# Patient Record
Sex: FEMALE | Race: ASIAN | NOT HISPANIC OR LATINO
[De-identification: names, ages, dates, MRNs, and addresses within clinical notes are randomized per-mention and may not be internally consistent; named-entity substitution may affect disease eponyms.]

---
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
## 2020-01-08 ENCOUNTER — PRENATAL OFFICE VISIT - HEALTHEAST (OUTPATIENT)
Dept: FAMILY MEDICINE | Facility: CLINIC | Age: 20
End: 2020-01-08

## 2020-01-08 DIAGNOSIS — Z34.93 ENCOUNTER FOR SUPERVISION OF NORMAL PREGNANCY IN THIRD TRIMESTER: ICD-10-CM

## 2020-01-08 DIAGNOSIS — N92.6 ABNORMAL MENSES: ICD-10-CM

## 2020-01-08 LAB
ALBUMIN UR-MCNC: NEGATIVE MG/DL
AMPHETAMINES UR QL SCN: NORMAL
BARBITURATES UR QL: NORMAL
BASOPHILS # BLD AUTO: 0 THOU/UL (ref 0–0.2)
BASOPHILS NFR BLD AUTO: 0 % (ref 0–2)
BENZODIAZ UR QL: NORMAL
CANNABINOIDS UR QL SCN: NORMAL
COCAINE UR QL: NORMAL
CREAT UR-MCNC: 48.4 MG/DL
EOSINOPHIL # BLD AUTO: 0.1 THOU/UL (ref 0–0.4)
EOSINOPHIL NFR BLD AUTO: 1 % (ref 0–6)
ERYTHROCYTE [DISTWIDTH] IN BLOOD BY AUTOMATED COUNT: 15.3 % (ref 11–14.5)
GLUCOSE UR STRIP-MCNC: NEGATIVE MG/DL
HCG UR QL: POSITIVE
HCT VFR BLD AUTO: 36.6 % (ref 35–47)
HGB BLD-MCNC: 11.5 G/DL (ref 12–16)
HIV 1+2 AB+HIV1 P24 AG SERPL QL IA: NEGATIVE
KETONES UR STRIP-MCNC: NEGATIVE MG/DL
LYMPHOCYTES # BLD AUTO: 1.2 THOU/UL (ref 0.8–4.4)
LYMPHOCYTES NFR BLD AUTO: 12 % (ref 20–40)
MCH RBC QN AUTO: 26.6 PG (ref 27–34)
MCHC RBC AUTO-ENTMCNC: 31.4 G/DL (ref 32–36)
MCV RBC AUTO: 85 FL (ref 80–100)
MONOCYTES # BLD AUTO: 0.6 THOU/UL (ref 0–0.9)
MONOCYTES NFR BLD AUTO: 6 % (ref 2–10)
NEUTROPHILS # BLD AUTO: 8 THOU/UL (ref 2–7.7)
NEUTROPHILS NFR BLD AUTO: 80 % (ref 50–70)
OPIATES UR QL SCN: NORMAL
OXYCODONE UR QL: NORMAL
PCP UR QL SCN: NORMAL
PLATELET # BLD AUTO: 259 THOU/UL (ref 140–440)
PMV BLD AUTO: 10.6 FL (ref 8.5–12.5)
RBC # BLD AUTO: 4.32 MILL/UL (ref 3.8–5.4)
WBC: 10 THOU/UL (ref 4–11)

## 2020-01-09 ENCOUNTER — HOSPITAL ENCOUNTER (OUTPATIENT)
Dept: ULTRASOUND IMAGING | Facility: HOSPITAL | Age: 20
Discharge: HOME | End: 2020-01-09
Attending: PHYSICIAN ASSISTANT

## 2020-01-09 LAB
ABO/RH(D): NORMAL
ABORH REPEAT: NORMAL
ANTIBODY SCREEN: NEGATIVE
BACTERIA SPEC CULT: NO GROWTH
HBV SURFACE AG SERPL QL IA: NEGATIVE
RUBV IGG SERPL QL IA: POSITIVE
T PALLIDUM AB SER QL: NEGATIVE

## 2020-01-11 LAB — LEAD BLDV-MCNC: 3.3 UG/DL (ref 0–4.9)

## 2020-01-12 LAB
COLLECTION METHOD: NORMAL
LEAD BLD-MCNC: NORMAL UG/DL

## 2020-01-19 ENCOUNTER — COMMUNICATION - HEALTHEAST (OUTPATIENT)
Dept: FAMILY MEDICINE | Facility: CLINIC | Age: 20
End: 2020-01-19

## 2020-01-29 ENCOUNTER — COMMUNICATION - HEALTHEAST (OUTPATIENT)
Dept: TELEHEALTH | Facility: CLINIC | Age: 20
End: 2020-01-29

## 2020-01-29 ENCOUNTER — PRENATAL OFFICE VISIT - HEALTHEAST (OUTPATIENT)
Dept: FAMILY MEDICINE | Facility: CLINIC | Age: 20
End: 2020-01-29

## 2020-01-29 DIAGNOSIS — Z34.82 ENCOUNTER FOR SUPERVISION OF OTHER NORMAL PREGNANCY IN SECOND TRIMESTER: ICD-10-CM

## 2020-01-29 ASSESSMENT — MIFFLIN-ST. JEOR: SCORE: 1373.03

## 2020-01-30 LAB
C TRACH DNA SPEC QL PROBE+SIG AMP: NEGATIVE
N GONORRHOEA DNA SPEC QL NAA+PROBE: NEGATIVE

## 2020-01-31 ENCOUNTER — AMBULATORY - HEALTHEAST (OUTPATIENT)
Dept: MATERNAL FETAL MEDICINE | Facility: HOSPITAL | Age: 20
End: 2020-01-31

## 2020-02-12 ENCOUNTER — COMMUNICATION - HEALTHEAST (OUTPATIENT)
Dept: FAMILY MEDICINE | Facility: CLINIC | Age: 20
End: 2020-02-12

## 2020-02-12 ENCOUNTER — PRENATAL OFFICE VISIT - HEALTHEAST (OUTPATIENT)
Dept: FAMILY MEDICINE | Facility: CLINIC | Age: 20
End: 2020-02-12

## 2020-02-12 DIAGNOSIS — O99.013 ANEMIA OF PREGNANCY IN THIRD TRIMESTER: ICD-10-CM

## 2020-02-12 DIAGNOSIS — Z34.83 ENCOUNTER FOR SUPERVISION OF OTHER NORMAL PREGNANCY IN THIRD TRIMESTER: ICD-10-CM

## 2020-02-12 LAB
ALBUMIN UR-MCNC: NEGATIVE MG/DL
FASTING STATUS PATIENT QL REPORTED: NO
GLUCOSE 1H P 50 G GLC PO SERPL-MCNC: 122 MG/DL (ref 70–139)
GLUCOSE UR STRIP-MCNC: NEGATIVE MG/DL
HGB BLD-MCNC: 10.3 G/DL (ref 12–16)
KETONES UR STRIP-MCNC: NEGATIVE MG/DL

## 2020-02-12 ASSESSMENT — MIFFLIN-ST. JEOR: SCORE: 1393.21

## 2020-02-13 ENCOUNTER — COMMUNICATION - HEALTHEAST (OUTPATIENT)
Dept: FAMILY MEDICINE | Facility: CLINIC | Age: 20
End: 2020-02-13

## 2020-02-21 ENCOUNTER — OFFICE VISIT - HEALTHEAST (OUTPATIENT)
Dept: MATERNAL FETAL MEDICINE | Facility: HOSPITAL | Age: 20
End: 2020-02-21

## 2020-02-21 ENCOUNTER — RECORDS - HEALTHEAST (OUTPATIENT)
Dept: ADMINISTRATIVE | Facility: OTHER | Age: 20
End: 2020-02-21

## 2020-02-21 DIAGNOSIS — O09.33 LATE PRENATAL CARE AFFECTING PREGNANCY IN THIRD TRIMESTER: ICD-10-CM

## 2020-02-21 DIAGNOSIS — O09.893 HIGH RISK TEEN PREGNANCY IN THIRD TRIMESTER: ICD-10-CM

## 2020-02-24 ENCOUNTER — COMMUNICATION - HEALTHEAST (OUTPATIENT)
Dept: FAMILY MEDICINE | Facility: CLINIC | Age: 20
End: 2020-02-24

## 2020-03-11 ENCOUNTER — PRENATAL OFFICE VISIT - HEALTHEAST (OUTPATIENT)
Dept: FAMILY MEDICINE | Facility: CLINIC | Age: 20
End: 2020-03-11

## 2020-03-11 DIAGNOSIS — Z34.90 NORMAL PREGNANCY: ICD-10-CM

## 2020-03-11 ASSESSMENT — MIFFLIN-ST. JEOR: SCORE: 1418.04

## 2020-03-25 ENCOUNTER — OFFICE VISIT - HEALTHEAST (OUTPATIENT)
Dept: FAMILY MEDICINE | Facility: CLINIC | Age: 20
End: 2020-03-25

## 2020-03-25 DIAGNOSIS — O99.013 ANEMIA OF PREGNANCY IN THIRD TRIMESTER: ICD-10-CM

## 2020-03-25 DIAGNOSIS — Z34.83 ENCOUNTER FOR SUPERVISION OF OTHER NORMAL PREGNANCY IN THIRD TRIMESTER: ICD-10-CM

## 2020-04-03 ENCOUNTER — RECORDS - HEALTHEAST (OUTPATIENT)
Dept: ULTRASOUND IMAGING | Facility: HOSPITAL | Age: 20
End: 2020-04-03

## 2020-04-03 ENCOUNTER — OFFICE VISIT - HEALTHEAST (OUTPATIENT)
Dept: MATERNAL FETAL MEDICINE | Facility: HOSPITAL | Age: 20
End: 2020-04-03

## 2020-04-03 ENCOUNTER — RECORDS - HEALTHEAST (OUTPATIENT)
Dept: ADMINISTRATIVE | Facility: OTHER | Age: 20
End: 2020-04-03

## 2020-04-03 DIAGNOSIS — O09.33 SUPERVISION OF PREGNANCY WITH INSUFFICIENT ANTENATAL CARE, THIRD TRIMESTER: ICD-10-CM

## 2020-04-03 DIAGNOSIS — O09.33 LATE PRENATAL CARE AFFECTING PREGNANCY IN THIRD TRIMESTER: ICD-10-CM

## 2020-04-08 ENCOUNTER — PRENATAL OFFICE VISIT - HEALTHEAST (OUTPATIENT)
Dept: FAMILY MEDICINE | Facility: CLINIC | Age: 20
End: 2020-04-08

## 2020-04-08 DIAGNOSIS — Q63.8 CONGENITAL DILATION OF RENAL PELVIS: ICD-10-CM

## 2020-04-08 DIAGNOSIS — Z34.83 ENCOUNTER FOR SUPERVISION OF OTHER NORMAL PREGNANCY IN THIRD TRIMESTER: ICD-10-CM

## 2020-04-08 DIAGNOSIS — Z34.82 ENCOUNTER FOR SUPERVISION OF OTHER NORMAL PREGNANCY IN SECOND TRIMESTER: ICD-10-CM

## 2020-04-09 LAB
ALLERGIC TO PENICILLIN: NO
GP B STREP DNA SPEC QL NAA+PROBE: NEGATIVE

## 2020-04-21 ENCOUNTER — VIRTUAL VISIT (OUTPATIENT)
Dept: UROLOGY | Facility: CLINIC | Age: 20
End: 2020-04-21
Attending: OBSTETRICS & GYNECOLOGY
Payer: COMMERCIAL

## 2020-04-21 DIAGNOSIS — O35.EXX0 PYELECTASIS OF FETUS ON PRENATAL ULTRASOUND: Primary | ICD-10-CM

## 2020-04-21 NOTE — LETTER
"2020       RE: Boby Sesay  [address] [place] [address]     Dear Colleague,    Thank you for referring your patient, Boby Sesay, to the PEDS UROLOGY at [facility]. Please see a copy of my visit note below.    Boby Sesay is a 19 year old female who is being evaluated via a billable telephone visit.      The patient has been notified of following:     \"This telephone visit will be conducted via a call between you and your physician/provider. We have found that certain health care needs can be provided without the need for a physical exam.  This service lets us provide the care you need with a short phone conversation.  If a prescription is necessary we can send it directly to your pharmacy.  If lab work is needed we can place an order for that and you can then stop by our lab to have the test done at a later time.    Telephone visits are billed at different rates depending on your insurance coverage. During this emergency period, for some insurers they may be billed the same as an in-person visit.  Please reach out to your insurance provider with any questions.    If during the course of the call the physician/provider feels a telephone visit is not appropriate, you will not be charged for this service.\"    Patient has given verbal consent for Telephone visit?  Yes    How would you like to obtain your AVS? Mail a copy    Opal Calvillo [address] [place] [address]    RE:  Boby Sesay  :  2000  [place] MRN:  [id number]  Date of visit:  2020        Dear Dr. Calvillo:    I had the pleasure of talking to your patient, Boby, today through via a telephone encounter in consultation for the question of fetal pyelectasis.  Please see below the details of this visit and my impression and plans discussed with the family.        CC:  No chief complaint on file.       HPI:  Boby is a 19 year old woman whom I was asked to see in consultation for the above.  This is " Boby's 2nd pregnancy.  She has a 1 year old son.  The sex of the fetus is female.  At the 35 5/7 week ultrasound it was noted that there was new mild dilation of the right renal pelvis, without dilation of the calyces, normal parenchymal echogenicity and thickness (UTD A1).  So far the pregnancy has been uncomplicated.  Boby is planning to deliver at [facility] in [place].  There is no known family history of genitourinary disorders in childhood.    PMH:  History reviewed. No pertinent past medical history.    PSH:   History reviewed. No pertinent surgical history.    Meds, allergies, family history, social history reviewed per intake form and confirmed in our EMR.    ROS:  Negative on a 12-point scale, except for pertinent positives mentioned in the HPI.    PE:  There were no vitals taken for this visit.  There is no height or weight on file to calculate BMI.  Physical exam not performed due to this being a telephone encounter.        Impression:  Fetus with mild right pyelectasis (UTD A1).        Diagnoses       Codes Comments    Pyelectasis of fetus on prenatal ultrasound    -  Primary O35.8XX0            Plan:    We discussed the likely prenatal causes for this, including prenatal obstructive issues that have already resolved versus those that may need surgical help with resolution in childhood, as well as the possibility of vesicoureteral reflux.  We discussed the risks for urinary tract infection, and signs and symptoms to be aware of.  We also made our plans for follow-up with myself after the baby is born with renal/bladder ultrasound around 4-8 weeks of age.  We discussed that this may need to be pushed out further due to the current coronavirus pandemic and subsequent limitations on routine procedures/imaging.  There is no need to start baby on low-dose Amoxicillin at this time as we will not plan for VCUG screening unless baby develops a UTI or there are concerns postnatally that would necessitate  this imaging.  I addressed all questions and encouraged a phone call from their MFM if there are any future concerns during the pregnancy.        Thank you very much for allowing me the opportunity to participate in this nice family's care with you.    Sincerely,    IVANA Park, KIMBER  Pediatric Urology, [facility]      Phone call duration: 17 minutes      Again, thank you for allowing me to participate in the care of your patient.      Sincerely,    IVANA Ye CNP

## 2020-04-21 NOTE — PATIENT INSTRUCTIONS
Plan:  1.  Please call Angie Quintanilla RN Care Coordinator at [phone] after baby is born.  She will help you set up a visit for baby at 1-2 months of age.    2.  We discussed that if baby develops a fever >100.4 without a clear localizing source or other concerning symptoms such as intractable pain or vomiting, we would want them to bring her to their local clinic for evaluation with a catheterized urine specimen if there is concern for urinary tract infection.    3.  As we may need to delay baby's initial visit and ultrasound beyond 2 months of age due to coronavirus, know that this is okay as long as baby is feeding and growing well, and not having urinary tract infections.  Please notify our office if baby is diagnosed with a UTI prior to our scheduled visit as we would want to see her sooner with an ultrasound, and possibly further testing.        [facility]   Department of Pediatric Urology  MD Galileo Mendez, AIRAM Jett NP    [facility] [phone] - Nurse Practitioner appointments   [phone] - Dr. Natarajan appointments     Urology Office:    Angie Quintanilla RN Care Coordinator    [phone] - fax     [place] [phone]    [place] [phone]    [place] scheduling    [phone]     Surgery Scheduling:   Divya   [phone]

## 2020-05-04 ENCOUNTER — AMBULATORY - HEALTHEAST (OUTPATIENT)
Dept: FAMILY MEDICINE | Facility: CLINIC | Age: 20
End: 2020-05-04

## 2020-06-05 ENCOUNTER — OFFICE VISIT - HEALTHEAST (OUTPATIENT)
Dept: FAMILY MEDICINE | Facility: CLINIC | Age: 20
End: 2020-06-05

## 2020-06-05 DIAGNOSIS — Z30.016 ENCOUNTER FOR INITIAL PRESCRIPTION OF TRANSDERMAL PATCH HORMONAL CONTRACEPTIVE DEVICE: ICD-10-CM

## 2020-06-05 ASSESSMENT — MIFFLIN-ST. JEOR: SCORE: 1364.52

## 2021-05-28 NOTE — PROGRESS NOTES
Post Partum Check:  Delivery at 37w1d now .  Date of delivery: 3/19/19    Patient concerns: none    Bleeding: no concerns    Pain: no concerns    LMP:Patient's last menstrual period was 2019.     Depression: no concerns  Postpartum Depression Screen EPDS Total Score: 2 (2019  3:00 PM)  Item 10 (suicidal thoughts): Negative.  Interpretation Guide: Possible Depression = 10 or greater.       Contraception Plan:  Declines.  Plans to have another child next year after she graduates from high school.  She is asking for my advice regarding this.  She did get culturally  to her significant other father the baby.  He is currently working nights.  She states he is loving and supportive.  They plan to get  after she graduates next year.    Immunizations needed:  none we will try to get updated shot records from school.    Pap smear:  too young    Physical Exam:  Blood pressure 98/60, pulse 84, resp. rate 12, weight 131 lb (59.4 kg), last menstrual period 2019, unknown if currently breastfeeding. Body mass index is 23.96 kg/m .  Genitourinary: declined.      Assessment and Plan     18 y.o.  here today for postpartum check.  1. Discussed ideal body weight.   2. Return to work: She is back to school and working already.  She has stopped breast-feeding due to this..  3. Pap smear Not 21 yet.  4. Contraception Plan: Declines..  5. Postpartum Evaluation of Pregnancy/Chronic Health Conditions: Blood loss anemia: Check hemoglobin.  6. Referrals: none.  7. Next physical exam due in one year.

## 2021-06-02 VITALS — WEIGHT: 151.25 LBS

## 2021-06-02 VITALS — WEIGHT: 140.04 LBS

## 2021-06-02 VITALS — WEIGHT: 142 LBS

## 2021-06-02 VITALS — WEIGHT: 153 LBS

## 2021-06-02 VITALS — WEIGHT: 147 LBS

## 2021-06-03 VITALS — BODY MASS INDEX: 23.96 KG/M2 | WEIGHT: 131 LBS

## 2021-06-04 VITALS
TEMPERATURE: 97.9 F | SYSTOLIC BLOOD PRESSURE: 98 MMHG | HEART RATE: 98 BPM | RESPIRATION RATE: 18 BRPM | BODY MASS INDEX: 29.42 KG/M2 | HEIGHT: 61 IN | WEIGHT: 155.8 LBS | DIASTOLIC BLOOD PRESSURE: 74 MMHG

## 2021-06-04 VITALS
BODY MASS INDEX: 27.18 KG/M2 | WEIGHT: 147.7 LBS | SYSTOLIC BLOOD PRESSURE: 102 MMHG | HEART RATE: 98 BPM | DIASTOLIC BLOOD PRESSURE: 60 MMHG | HEIGHT: 62 IN | TEMPERATURE: 97.6 F

## 2021-06-04 VITALS
SYSTOLIC BLOOD PRESSURE: 112 MMHG | DIASTOLIC BLOOD PRESSURE: 70 MMHG | TEMPERATURE: 97.8 F | HEART RATE: 108 BPM | BODY MASS INDEX: 29.47 KG/M2 | WEIGHT: 157.25 LBS

## 2021-06-04 VITALS
BODY MASS INDEX: 26.52 KG/M2 | WEIGHT: 144.13 LBS | SYSTOLIC BLOOD PRESSURE: 104 MMHG | TEMPERATURE: 97.5 F | OXYGEN SATURATION: 98 % | HEART RATE: 99 BPM | DIASTOLIC BLOOD PRESSURE: 62 MMHG | HEIGHT: 62 IN

## 2021-06-04 VITALS
TEMPERATURE: 97.9 F | SYSTOLIC BLOOD PRESSURE: 100 MMHG | DIASTOLIC BLOOD PRESSURE: 65 MMHG | HEIGHT: 61 IN | HEART RATE: 86 BPM | BODY MASS INDEX: 27.19 KG/M2 | RESPIRATION RATE: 20 BRPM | WEIGHT: 144 LBS

## 2021-06-04 VITALS
HEART RATE: 84 BPM | DIASTOLIC BLOOD PRESSURE: 70 MMHG | RESPIRATION RATE: 18 BRPM | BODY MASS INDEX: 25.24 KG/M2 | WEIGHT: 138 LBS | SYSTOLIC BLOOD PRESSURE: 98 MMHG

## 2021-06-05 NOTE — PATIENT INSTRUCTIONS - HE
Pregnancy: about 29-30 weeks    Due Date: March or April 2020    We will call you after the ultrasound to talk about when the baby is due and which doctor can see you for your pregnancy.

## 2021-06-05 NOTE — PROGRESS NOTES
Chief Complaint:  Chief Complaint   Patient presents with     pregnancy confirmation     HPI:   Boby Sesay is a 19 y.o. female is here for pregnancy intake.  She is .  Patient's last menstrual period was 2019.  Last delivery 3/19/19.  Positive home pregnancy test in July.    Past medical history: reviewed and updated.  Past Medical History:   Diagnosis Date     Chlamydia infection affecting pregnancy in third trimester 2019      (normal spontaneous vaginal delivery)      No past surgical history on file.    Current Outpatient Medications:      prenatal vit,calc76-iron-folic (PNV 29-1) 29 mg iron- 1 mg Tab, Take 1 tablet by mouth daily., Disp: 100 each, Rfl: 3    Social:  Social History     Tobacco Use     Smoking status: Passive Smoke Exposure - Never Smoker     Smokeless tobacco: Never Used     Tobacco comment: father smokes outside   Substance Use Topics     Alcohol use: No     Frequency: Never     Drug use: No       OBJECTIVE:  No Known Allergies  Vitals:    20 1509   BP: 98/70   Pulse: 84   Resp: 18     Body mass index is 25.24 kg/m .    Vital signs stable as recorded above  General: Patient is alert and oriented x 3, in no apparent distress  Fetal Exam: Fetal heart tones are heard at 145 bpm.  Patient reports positive fetal movement.    Results:  Results for orders placed or performed in visit on 20   Culture, Urine   Result Value Ref Range    Culture No Growth    Pregnancy (Beta-hCG, Qual), Urine   Result Value Ref Range    Pregnancy Test, Urine Positive (!) Negative   ABO/RH Typing (OP order)   Result Value Ref Range    HML ABO/Rh Typing O POS     HML ABO/Rh Repeat Typing O POS    Hepatitis B Surface antigen (HBsAG)   Result Value Ref Range    Hepatitis B Surface Ag Negative Negative   HIV Antigen/Antibody Screening Cascade   Result Value Ref Range    HIV Antigen / Antibody Negative Negative   HML Antibody Screen   Result Value Ref Range    HML Antibody Screen Negative Negative    Lead, Blood   Result Value Ref Range    Lead      Collection Method Venous    RPR   Result Value Ref Range    Treponema Antibody (Syphilis) Negative Negative   Rubella Immune Status (IgG)   Result Value Ref Range    Rubella Antibody, IgG Positive    Drugs of Abuse 1,Urine   Result Value Ref Range    Amphetamines Screen Negative Screen Negative    Benzodiazepines Screen Negative Screen Negative    Opiates Screen Negative Screen Negative    Phencyclidine Screen Negative Screen Negative    THC Screen Negative Screen Negative    Barbiturates Screen Negative Screen Negative    Cocaine Metabolite Screen Negative Screen Negative    Oxycodone Screen Negative Screen Negative    Creatinine, Urine 48.4 mg/dL   Urinalysis, OB Screen   Result Value Ref Range    Glucose, UA Negative Negative    Ketones, UA Negative Negative    Protein, UA Negative Negative mg/dL   HM1 (CBC with Diff)   Result Value Ref Range    WBC 10.0 4.0 - 11.0 thou/uL    RBC 4.32 3.80 - 5.40 mill/uL    Hemoglobin 11.5 (L) 12.0 - 16.0 g/dL    Hematocrit 36.6 35.0 - 47.0 %    MCV 85 80 - 100 fL    MCH 26.6 (L) 27.0 - 34.0 pg    MCHC 31.4 (L) 32.0 - 36.0 g/dL    RDW 15.3 (H) 11.0 - 14.5 %    Platelets 259 140 - 440 thou/uL    MPV 10.6 8.5 - 12.5 fL    Neutrophils % 80 (H) 50 - 70 %    Lymphocytes % 12 (L) 20 - 40 %    Monocytes % 6 2 - 10 %    Eosinophils % 1 0 - 6 %    Basophils % 0 0 - 2 %    Neutrophils Absolute 8.0 (H) 2.0 - 7.7 thou/uL    Lymphocytes Absolute 1.2 0.8 - 4.4 thou/uL    Monocytes Absolute 0.6 0.0 - 0.9 thou/uL    Eosinophils Absolute 0.1 0.0 - 0.4 thou/uL    Basophils Absolute 0.0 0.0 - 0.2 thou/uL   Lead, Blood, Venous   Result Value Ref Range    Lead, Blood (Venous) 3.3 0.0 - 4.9 ug/dL     Other screening pregnancy lab work is ordered and pending.    Patient scored a 0/30 on [place]  Depression Screen.    Assessment and Plan:  1. Pregnancy Intake Appointment.  Boby Sesay is 19 y.o. and .  Patient's last menstrual period was  06/19/2019.  Estimated Date of Delivery: 5/3/20  She will be seeing Dr. Meyer for OB care.  Screening pregnancy lab work was drawn.  Prenatal vitamins prescribed.  Problem list and current medications reviewed and updated.  Dating ultrasound ordered.  Prenatal info packet given.  Last delivery 3/19/19.  Patient declines public health nurse.    Follow up in 2-3 weeks.  Please see OB Episode for complete details.    This dictation uses voice recognition software, which may contain typographical errors.

## 2021-06-05 NOTE — PROGRESS NOTES
First OB   Feeling well.   No Nausea  Good Appetite  No pelvic pain  No other concerns.   No ctx, lof, bleeding, or discharge.  No HA or vision changes.    Taking PNV: no , didn't .     Labs/imaging reviewed: dating US EFW <10%, but poor dates  Discussed screening for aneuploidy and neural tube defects, SMA and CF.  declines     Preformed physical exam   Reviewed BRENDA, past medical history, past surgical history, family history, genetic history, and previous obstetrical history.    Encouraged good nutrition, well balanced diet, regular activity.  Discussed foods to avoid.  And other applicable safety/health risks in pregnancy.    Mu was seen today for initial prenatal visit.    Diagnoses and all orders for this visit:    Encounter for supervision of other normal pregnancy in second trimester  -     prenatal vit,calc76-iron-folic (PNV 29-1) 29 mg iron- 1 mg Tab; Take 1 tablet by mouth daily.  -     Chlamydia trachomatis & Neisseria gonorrhoeae, Amplified Detection  -     Urinalysis, OB Screen  -     Ambulatory referral to [facility]- Pregnancy      Will send to [facility] to repeat a growth ultrasound to assess EFW and appropriate dating.  Follow-up in 2 weeks as late to prenatal care and can do 28-week labs and Tdap.    Thelma Meyer

## 2021-06-06 NOTE — PROGRESS NOTES
"Please see the \"Imaging\" tab for details of today's ultrasound.    Evelin Mcknight MD  Specialist in Maternal-Fetal Medicine    "

## 2021-06-06 NOTE — TELEPHONE ENCOUNTER
"Called and left message for pt to return call.# 3  \" Okay to relay message\"  Okay to send letter?   "

## 2021-06-06 NOTE — TELEPHONE ENCOUNTER
----- Message from Thelma Meyer DO sent at 2/22/2020 10:16 AM CST -----  Please call patient and inform:  Fetal Growth is looking good.

## 2021-06-06 NOTE — TELEPHONE ENCOUNTER
----- Message from Thelma Meyer, DO sent at 2/12/2020  3:40 PM CST -----  Is someone able to call the [facility] clinic in [place] and help her reschedule her appointment.  Was very confused and turned around on where to go.  Went to [facility] and told it was across the street.  I think she just went to the wrong clinic.

## 2021-06-06 NOTE — TELEPHONE ENCOUNTER
Called and spoke with Boby Sesay. Gave her the following address and phone to call to reschedule [facility] [employer] appt. Re-stated that she needs to go to the 3rd floor, room 302. Also informed her this is NOT in [facility].    [facility]  3rd Floor, [address]  [address] [place] [address]  Appt line: [phone]    Boby Sesay understood. No further questions.

## 2021-06-06 NOTE — PROGRESS NOTES
No ctx, lof, bleeding, or discharge.  No HA or vision changes.  Good FM: yes    Mu was seen today for routine prenatal visit.    Diagnoses and all orders for this visit:    Normal pregnancy  -     Urinalysis, OB Screen (ketones, glucose, protein only)    PPBC: thinking patch  considering epidural again  Formula feeding so she can go back to work   Handout given  F/u in 2 weeks.     Thelma Meyer

## 2021-06-06 NOTE — PROGRESS NOTES
Please call patient and inform:  You passed the blood sugar test. No signs of gestational diabetes.  Your hemoglobin continues to decrease a little bit down to 10.3.  I would like you to start on an iron tablet daily.  I will send prescription to your pharmacy.

## 2021-06-06 NOTE — PROGRESS NOTES
No ctx, lof, bleeding, or discharge.  No HA or vision changes.  Good FM : yes  Tried to go to the [facility] clinic for growth ultrasound as scheduled but went to [facility] and told it was across street then told they did not have her name and that it was across the street.  Ultimately did not make it to the appointment.  Still confused on where to go    Mu was seen today for routine prenatal visit.    Diagnoses and all orders for this visit:    Encounter for supervision of other normal pregnancy in third trimester  -     Glucose,Gestational Challenge (1 Hour)  -     Hemoglobin  -     Cancel: Syphilis Screen, [place]  -     Tdap vaccine greater than or equal to 6yo IM    Anemia of pregnancy in third trimester  -     ferrous sulfate 325 (65 FE) MG tablet; Take 1 tablet (325 mg total) by mouth daily.      Handout given  I would still like her to see [facility] for a follow-up ultrasound.  We will try to help her reschedule this.  F/u in 4 weeks.     Thelma Meyer

## 2021-06-06 NOTE — PATIENT INSTRUCTIONS - HE
Patient Education   HEALTHY PREGNANCY CARE: 30-34 WEEKS PREGNANT    You have made it to the final months of your pregnancy. By now, your baby is starting to fill out with some fat under his skin, fuzzy hair on his shoulders, and is gaining 4 to 6 ounces per week.    Discuss any travel plans with your midwife or physician.    Review possible changes in sexuality during later pregnancy and discuss these with your midwife or physician, as well as your partner. Alternative love-making positions may be more comfortable.    Discuss labor and delivery issues with your midwife or physician. If you had a  birth in the past, discuss a trial of labor with your midwife or physician. He or she may ask that you sign a consent form, if you wish to have a vaginal birth after  (). Ask your midwife or physician to explain your options for managing pain during your labor and delivery. Sometimes, during the birth process, an episiotomy may need to be cut in the vagina to make the opening bigger or let the baby come out quicker. You may want to discuss the episiotomy and how often it is needed with your midwife or physician.    Plan for your baby's care by selecting a child health care provider (Family physician, Pediatrician, or Pediatric Nurse Practitioner). Practice installing an infant car seat correctly in the car. Ask for car seat information as needed and make sure it is safe and will work in the car your baby will ride in. You will need a car seat to bring your baby home from the hospital. Check the procedure for adding your baby to your health care plan. Review your decision about circumcision and ask for any information you need. As you buy and receive items for your baby, don't put a baby walker on your list. Walkers can be dangerous and can cause serious injury to your child. A safer option is a saucer-type play station, since it doesn't allow baby to travel across the floor.    Discuss your choices and  plans for birth control with your midwife or physician. Women who are breastfeeding can still become pregnant. Use a birth control method if you want to lower your pregnancy risk. Talk to your midwife or physician if you are considering permanent birth control, such as tubal ligation or Essure. You may need to complete a consent form 30 days prior to delivery in order to have this done after you deliver.    Continue to watch for signs and symptoms of preeclampsia:     Sudden swelling of your face, hands, or feet     New vision problems such as blurring, double vision, or flashing lights    A severe headache not relieved with acetaminophen (Tylenol)    Sharp or stabbing pain in your right or middle upper abdomen    Watch for signs and symptoms of premature labor:     Regular contractions. This means having about 6 or more within 1 hour, even after you have had a glass of water and are resting.     A backache that starts and stops regularly.    An increase or change in vaginal discharge, such as heavy, mucus-like, watery, or bloody discharge.     Your water breaks or leaks.    If you have any of the above symptoms or any other concerns, call your provider or their clinic staff at [facility] FAMILY MEDICINE/OB at Phone: [phone]. If it's after clinic hours, physician patients should call the [facility] Connection at [phone] ([phone]); midwife patients should call their answering service at [phone].    How can you care for yourself at home?   You can refer to the Starting Out Right book or find it online at http://www.[email or web address]/images/stories/maternity/[facility]-[email or web address]-Right.pdf or http://www.[facility].org/images/stories/flipbooks/[facility]-starting-out-right/[facility]-starting-out-right.html#p=8     You can sign up for a weekly parenting e-mail that gives support, tips and advice from health care professionals that starts with pregnancy and continues through the toddler years. To register,  go to [email or web address] at any time during your pregnancy.

## 2021-06-06 NOTE — TELEPHONE ENCOUNTER
Call and spoke with pt and relay the message to pt. Pt has no question.      ----- Message from Thelma Meyer DO sent at 2/12/2020  2:54 PM CST -----  Please call patient and inform:  You passed the blood sugar test. No signs of gestational diabetes.  Your hemoglobin continues to decrease a little bit down to 10.3.  I would like you to start on an iron tablet daily.  I will send prescription to your pharmacy.

## 2021-06-07 NOTE — NURSING NOTE
Dr. Dempsey would like this patient to have a Pediatric Urology Consult for the diagnosis of R. Urinary tract dilation-new finding at 35 weeks (UTD-A1).     Order placed in [facility], educated patient on need to call number provided if patient does not receive call from them.

## 2021-06-07 NOTE — PROGRESS NOTES
Reviewed plan for covid-19 in pregnancy. Not working right now.  Partner works as PCA.    Reviewed u/s from [facility] regarding healthy growth and mild right renal pelvis dilation.  They recommended f/u with pediatric urology before delivery- has phone visit set up. Has needed supplies for baby- car seat, etc.      GBS today.

## 2021-06-07 NOTE — PATIENT INSTRUCTIONS - HE
Patient Education   HEALTHY PREGNANCY CARE: 37 to 41 WEEKS PREGNANT    Talk with your midwife or physician about when to call with signs of labor    Regular uterine contractions that are getting closer together and/or stronger    If you think your water has broken or is leaking    Bleeding from the vagina like a period (bloody vaginal discharge is normal)    If you are not feeling your baby move    Make plans for transportation and  as needed for when you are going to the hospital.    Your midwife or physician may offer to check your cervix for changes.     Ask your health care provider about vaccinations you may need following delivery. By now, you should have received a Tdap immunization to protect against pertussis or whooping cough. Fathers and family members who will be in close contact with the baby should also receive a Tdap shot at least two weeks before the expected birth of the baby if they have not had a Td (tetanus) shot for at least two years.    If you are past your due date, discuss the next steps leading to delivery with your midwife or physician. If you don't start labor on your own by 41 or 42 weeks, your midwife or physician may recommend giving you medicines to ripen your cervix and start labor.    Preparing for your baby: Tell your midwife or physician how you plan to feed your baby (breast or bottle), who you have chosen to do pediatric care for your baby, and if you have a boy, whether you have chosen to have him circumcised. You will need a car seat correctly installed in your vehicle to bring your baby home. As you start to set up the nursery at home for your baby, make sure the crib is safe. The mattress needs to fit snugly against the edges of the crib. If you can fit a soda can between the bars, they are too far apart and can allow the baby's head to caught between them.    Learn about infant care and feeding, including information about infant CPR. We recommend that you put  your baby to sleep on his or her back to reduce the chance of Sudden Infant Death Syndrome (SIDS). To maintain a healthy environment in which your child can grow, it's best to keep your home smoke-free. By preparing ahead, your transition into parenthood will go smoothly for you and your baby.    Your midwife or physician will want to see you for a checkup 2 to 6 weeks after delivery.    If you have questions about any symptoms you are experiencing or any other concerns, call your provider or their clinic staff at [facility]/OB at Phone: [phone]. If it's after clinic hours, physician patients should call the [facility] at [phone] ([phone]); midwife patients should call their answering service at [phone].    How can you care for yourself at home?   You can refer to the Starting Out Right book or find it online at [email or web address] or [email or web address]/[email or web address]-out-[email or web address]-starting-[email or web address]#[email or web address]     You can sign up for a weekly parenting e-mail that gives support, tips and advice from health care professionals that starts with pregnancy and continues through the toddler years. To register, go to [email or web address] at any time during your pregnancy.    Making Early Breastfeeding or Chestfeeding Work: What's Important?  Breastfeeding/chestfeeding is important!     It helps keep babies healthy.    Parents who breastfeed/chestfeed have lower risks of breast and ovarian cancer.    It's convenient: the milk is always ready and warm, and there is nothing to mix or prepare for feeding.    Formula is harder for your baby to digest.    It helps you bond with your baby and protects against postpartum depression.  Lots of early skin-to-skin contact with your baby    Place your naked baby with baby's belly against your bare chest. Cover baby's back with a  "blanket    Start skin-to-skin right after birth, as soon as you are ready    Skin-to-skin:  ? Helps keep baby warm  ? Improves baby's oxygen and blood sugar levels  ? Helps your uterus contract and bleed less  ? Helps baby feel calm and comforted  ? Helps you feel close to baby  ? Helps get breastfeeding started. Being close makes latching on easier, and baby may move over to the nipple and latch without help  ? Baby breastfeeds better and longer when skin-to-skin  Placing baby well for good attachment to the breast or chest    Hold your baby close with baby's tummy touching your tummy.    Wait for baby to open mouth wide, then bring baby onto the breast/chest.    Baby should take a big mouthful of breast/chest, not just the nipple. This helps baby get more milk, and the suckling should feel comfortable.    When baby is latched well to the breast/chest, nipples aren't cracked or painful.  Keeping baby near (called \"rooming-in\" at the hospital)    Baby sleeps better and cries less when birth parent is near. Your room is quiet.    We will place your baby safely on their back in their bassinette. This lets you practice safe sleep for your baby while keeping them at your bedside.    Baby feeds more often, which means your milk supply increases faster, and your baby loses less weight.    Parents have an easier time getting to know and bonding with baby.    Parents feel much more confident about baby care and breastfeeding/chestfeeding.    Maternity staff can help at any time.  Feeding on cue    Feeding on cue simply means feeding whenever your baby shows signs of hunger    Crying is a late hunger sign. Feed baby whenever baby wants for as long as baby wants.    Feeding signs: mouth movements, sticking the tongue out, rooting (baby turns toward chest and may open mouth), hand-to-mouth movements    Advantages of feeding on cue:  ? Frequent breastfeeding/chestfeeding in the first weeks after birth gives you a good milk " "supply for months to come.  ? Babies settle into a relaxing feed more quickly. Babies enjoy feedings more when they don't have to cry to be fed.  ? Feeding is comfort as well as nutrition. Newborns love constant closeness and feeding and can't be held \"too much\" or \"spoiled.\"  ? Newborns need small frequent feedings in the first days of life. Just one to three teaspoons fill a new baby's stomach.  ? Responding to feeding cues helps babies gain weight.  Feeding only human milk in the first six months    Colostrum is the first milk that baby gets at birth. The amount of colostrum matches the baby's stomach, so it will not be overfilled.    The small volumes ready at birth are also easier for baby to handle.    All babies lose weight in the first few days. This is simply \"water weight.\"    Introducing food or fluids other than human milk too early can cause problems for breastfeeding/chestfeeding and for your baby's health.    Feeding only human milk maximizes the protection against disease and infections.    Your body knows how much milk to make by how often your baby feeds. If you give your baby formula, your body may not know how much milk to make.    For informational purposes only. Not to replace the advice of your health care provider. Adapted with permission from \"Getting Started with Infant Care and Breastfeeding,\" by ROXANNE June, SHARDA, Annie Mccormick, RN, IBCLC, Thelma Smith MD, SHARDA, and Hafsa Malone MD, IBCLC. [place] [employer], 2017. Clinically reviewed by [facility]. [employer] [id number] - 05/19.        [facility] Breastfeeding Resources     Research shows that human milk offers the best  nutrition and protection for babies. So at [facility],  we care for families and babies in a way that  promotes, teaches and supports lactation. We  support all breastfeeding, chestfeeding and human  milk feeding families, as well as families who can t  breastfeed / chestfeed or who " choose not to do so.  A mother or caregiver s own milk is best for a baby,  but if you are unable to breastfeed / chestfeed, we  may suggest pasteurized donor human milk for your  baby while in the hospital.    Lactation support    The following [facility] locations offer  individual outpatient lactation consults. Some  locations offer phone or group lactation consults  with certified lactation consultants. Call to confirm  services and for information and appointments. You  may wish to call your insurance company first to see  if they will cover the cost of a consult.    [facility]: [phone]    [facility]: [phone]    [facility]: [phone]  Offers [place] First Days program, which includes  group lactation visits and one free information session  about delivering your baby at a designated [facility] hospital and the importance and benefits  of breastfeeding and human milk. These group visits  also help patients with feeding concerns and offer  information about other postpartum topics.                For informational purposes only. Not to replace the advice of your health care provider. Copyright   2005 [facility]. All rights reserved. [employer] 858572 - REV          [facility] ([place]):  [phone]    [facility] ([place]):  [phone] or [phone]    [facility] [place]):  [phone]    [facility]  ([place]): [phone]    [facility] ([place]):  [phone] or [phone]  Includes follow-up visits for caregivers of babies  discharged from the  intensive care unit  (NICU) at [facility].    [facility]):  [phone]    [facility] ([facility],  [facility], primary care clinics):  [phone]  Also offers  weight checks, feeding discussions and support with a lactation consultant as a part of free, weekly support group.    [facility]: [phone]  Also offers a free weekly support group.                                                                                                                                                                                                      (continued)   You may also call [facility] at [phone]  for information about [facility] and [facility]  locations that offer lactation support. For information  about breastfeeding / chestfeeding and childbirth  classes in the [facility], go to [facility] at www.[facility] [employer]. For [facility], go to [email or web address] and click on  Classes and Events at the bottom of the page. Or, call  [phone].    Supplies    You can get breast pumps from the [facility] nurses  at [facility] or [facility]  nurses at [facility]. Call your health  insurance to see if they will cover the cost of the  pump. Tell your nurse you d like a pump. They will  help you fill out the right paperwork. The pump will  be ready for you when you leave the hospital.    If you decide to get a pump after you leave the  hospital, you can get one from our partners at  [facility]. [facility] carries a range of  feeding supplies and pumps. Please call your health  insurance to see if they will cover the cost of the  pump. Then call [facility]  to find out what supplies and pumps are available.  Some stores may deliver the pump to your home.    [facility]:  [email or web address] Other lactation services    [facility]: [phone] (24 hours a day)  [email or web address]  Offers support for breastfeeding / chestfeeding and  human milk feeding families. Call to find a group in  your area.    National Women s Health  Information Center:  [phone]  www.womenshealth.gov/breastfeeding  Offers a breastfeeding / chestfeeding information  line in English and Romanian.    WIC (Women, Infants and Children) Program:  [phone]  Offers breastfeeding / chestfeeding counseling. Call  to find an office near you.    Milk banking    [facility]  [email or web address]  [phone]  Consider freezing your extra collected milk to donate  to babies in need. Email or call for information.                       If you are deaf or hard of hearing, please let us know. We provide many free services including  sign language interpreters, oral interpreters, TTYs, telephone amplifiers, note takers and written materials.

## 2021-06-07 NOTE — PROGRESS NOTES
"Boby Sesay is a 19 y.o. female who is being evaluated via a billable telephone visit.      The patient has been notified of following:     \"This telephone visit will be conducted via a call between you and your physician/provider. We have found that certain health care needs can be provided without the need for a physical exam.  This service lets us provide the care you need with a short phone conversation.  If a prescription is necessary we can send it directly to your pharmacy.  If lab work is needed we can place an order for that and you can then stop by our lab to have the test done at a later time.    If during the course of the call the physician/provider feels a telephone visit is not appropriate, you will not be charged for this service.\"       Boby Sesay complains of    Chief Complaint   Patient presents with     OB Routine     no other concerns        I have reviewed and updated the patient's Past Medical History, Social History, Family History and Medication List.    ALLERGIES  Patient has no known allergies.     Additional provider notes:   No ctx, lof, bleeding, or discharge.  No HA or vision changes, CP or SOB. No LE swelling.  Good FM : yes  Not taking her iron anymore- doesn't like to take it.     Assessment/Plan:  1. Encounter for supervision of other normal pregnancy in third trimester  F/u in clinic in 2 weeks for GBS swab    2. Anemia of pregnancy in third trimester  Encouraged her to start taking daily as we are anticipating a blood shortage. She states understanding and will restart. Refill provided.   - ferrous sulfate 325 (65 FE) MG tablet; Take 1 tablet (325 mg total) by mouth daily.  Dispense: 90 tablet; Refill: 3        Phone call duration:  7 minutes    Thelma Meyer, DO      "

## 2021-06-07 NOTE — PROGRESS NOTES
"Please see \"Imaging\" tab under Chart Review for full report.  This ultrasound was performed in the [facility], and may be located under Care Everywhere.    Angie Dempsey MD  Maternal Fetal Medicine    "

## 2021-06-08 NOTE — PROGRESS NOTES
Post Partum Check:    Delivery at 38w4d now .  Date of delivery: 2020    Patient concerns: none    Bleeding: no concerns    Pain: no concerns    LMP:Patient's last menstrual period was 2019.     Depression: no concerns  EPDS Score: 0    Contraception Plan:  Ortho evra  Non smoker.  No personal or family VTE    Immunizations needed:  none    Pap smear:  Not indicated.    Assessment:  Well post partum check up.    Plan:  Ortho evra.  Discussed implications for milk production.  She wants to quit breast feeding.

## 2021-06-16 PROBLEM — Z34.93 ENCOUNTER FOR SUPERVISION OF NORMAL PREGNANCY IN THIRD TRIMESTER: Status: ACTIVE | Noted: 2019-01-22

## 2021-06-16 PROBLEM — Z34.90 PREGNANT: Status: ACTIVE | Noted: 2020-04-23

## 2021-06-18 NOTE — LETTER
Letter by Thelma Meyer DO at      Author: Thelma Meyer DO Service: -- Author Type: --    Filed:  Encounter Date: 2/7/2019 Status: (Other)       Boby Sesay  [address] [place] [address]            February 12, 2019      Dear Boby Sesay,    You were tested positive for Chlamydia. You and your partner need to be treated. I sent a prescription to your pharmacy. If your partner is a patient here, we could send a prescription for him too.    Thank you,      Electronically signed by Rachel Julien MD

## 2021-06-18 NOTE — PATIENT INSTRUCTIONS - HE
Patient Instructions by Thelma Meyer DO at 2/12/2020  2:00 PM     Author: Thelma Meyer DO Service: -- Author Type: Physician    Filed: 2/12/2020  2:20 PM Encounter Date: 2/12/2020 Status: Signed    : Thelma Meyer DO (Physician)       Patient Education   HEALTHY PREGNANCY CARE: 26-30 WEEKS PREGNANT    You are now in your last trimester of pregnancy. Your baby is growing rapidly, can open and close her eyelids, sometimes get hiccups, and you'll probably feel her moving around more often. Your baby has breathing movements and is gaining about one ounce each day. You may notice heartburn and leg cramps. Your back may ache as your body gets used to your baby's size and length.    Discuss your work situation with your midwife or physician as needed. If you stand for long periods of time, you may need to make changes and take breaks.    Pre-register online at the hospital where your baby will be born ([email or web address])     Be aware of your baby's activity level. You may be asked to do daily fetal movement counts. Contact your midwife or physician about any decreased movement.    You may have been tested for gestational diabetes today. If you are RH negative, you may have had an additional test and a Rhogam injection.    Consider receiving a Tdap vaccination to protect your baby from Pertussis/whooping cough.    If you are considering tubal ligation, discuss this with your midwife or physician. You will need to have an appointment with the obstetrician who will do the surgery to discuss the risks, benefits, and alternatives, and to sign the consent. This can be discussed at your next visit.    Continue to watch for any signs or symptoms of premature labor:     Regular contractions. This means having about 6 or more within 1 hour, even after you have had a glass of water and are resting.     A backache that starts and stops regularly.    An increase or change  "in vaginal discharge, such as heavy, mucus-like, watery, or bloody discharge.     Your water breaks or leaks.    Continue to watch for signs and symptoms of preeclampsia:     Sudden swelling of your face, hands, or feet     New vision problems such as blurring, double vision, or flashing lights    A severe headache not relieved with acetaminophen (Tylenol)    Sharp or stabbing pain in your right or middle upper abdomen    If you have any of the above symptoms or any other concerns, call your midwife or physician or their clinic staff at [facility]/OB at Phone: [phone]. If it's after clinic hours, physician patients should call the [facility] at [phone] ([phone]); midwife patients should call their answering service at [phone].    How can you care for yourself at home?   You can refer to the Starting Out Right book or find it online at [email or web address] or [email or web address]     You can sign up for a weekly parenting e-mail that gives support, tips and advice from health care professionals that starts with pregnancy and continues through the toddler years. To register, go to [email or web address] at any time during your pregnancy.    BREASTFEEDING TIPS FOR NEW MOMS     Importance of skin-to-skin contact:  ? Gets breastfeeding off to a good start  ? Keeps baby warm and is great for bonding  ? Provides calming effects and helps to stabilize breathing and  blood sugar  ? Helps the uterus to contract and bleed less    Importance of feeding whenever baby shows signs of  being hungry, \"feeding on cue\":  ? Helps create a good milk supply appropriate to the babys needs  ? Less breastfeeding complications such as engorgement or  low supply  ? Helps baby get enough milk  ? Milk supply is determined by how often baby " nurses and empties  the breast.  ? Feeding is for comfort as well as nutrition    Importance of good latch (positioning and attaching  baby properly at breast):  ? Helps prevent sore nipples  ? Helps ensure baby gets enough milk and supports moms breast  milk supply    Risks of giving baby supplements other  than moms breastmilk  Breastfeeding alone is recommended for the first 6 months, if not it:  ? Can make baby more prone to illness  ? Can make baby less satisfied at breast (wanting larger amounts or  faster flow)  ? Reduces milk supply    Importance of rooming-in:  ? Increases parent confidence while mother is supported by the  hospital staff  ? Caregivers learn how to care for baby and recognize feeding cues  ? Enables feeding whenever baby needs to eat  ? Baby is comforted with mom and learns to recognize caregivers    Avoiding use of pacifiers:  ? Use of pacifiers in the first weeks can make it difficult to make a full  milk supply  ? May interfere with baby learning to latch well      2017 [facility] [address]. SW [id number]. DOD 11.17           Breastfeeding   Why Its Important and What to Expect     Your breast milk is the best food for your baby--and  breastfeeding can help you be healthy as well.    Though breastfeeding is natural, it is a learned process for both mother and baby. To prepare, there are things you can learn--and do--before your baby is born.    ? Learn the benefits of breastfeeding.    ? Understand the basic process.    ? Know what to expect in the hospital.    ? Arrange breastfeeding support for the first few  weeks after birth.    ? Take a breastfeeding class (see back page).    ? Talk to your midwife, nurse or doctor if you have  Questions.    The benefits of breastfeeding    Human milk changes to meet the needs of a growing baby. It is all a baby needs for the first six months of life.    In fact, babies who receive only human milk for the  first six months are less likely to develop colds,  the  flu, colic, asthma, ear infections, food allergies and  diarrhea (loose, watery stools). They may be less likely      to be overweight as children, and they are less likely to develop diabetes later in life. Some studies also show that infants have a higher IQ if they are .    Breastfeeding can:    ? Help you and your baby develop a special bond--  and make you feel proud that you can feed your  Baby.    ? Reduce the total amount of blood you will lose  after delivery.    ? Help your uterus return to its non-pregnant size.    ? Reduce the risk of Sudden Infant Death Syndrome (SIDS).    ? Help you lose your pregnancy weight more quickly.    ? Help delay the return of your monthly periods.    ? Lower your risk of some breast and ovarian  cancers--as well as osteoporosis (bone loss)--later in life.    ? Save you more than $300 per month. (This  includes the cost of formula and medical bills.  Formula-fed babies get sick more often.)          If you are deaf or hard of hearing, please let us know. We provide many free services including  sign language interpreters, oral interpreters, TTYs, telephone amplifiers, note takers and written materials.        How to breastfeed    Skin-to-skin contact    Hold your baby on your chest skin-to-skin right after  birth. Skin contact calms your baby, steadies their  breathing and keeps your baby warm. Your baby will be alert and will likely want to feed within the first hour after birth.    Babies are born with reflexes that help them  breastfeed. Your body will be ready with early milk  (called colostrum), so you will have all the milk your  baby needs for that first feeding. Your nurse will help you get started.    Keep your baby with you and breastfeed whenever  your baby is hungry. Offering the breast early and  often helps your body keep making lots of milk.    How to position your baby    There are many positions for breastfeeding.              No matter which  position you choose, support your  babys back, shoulders and neck. The head and body should be in a straight line, and the entire body should face the breast. Your baby should be able to tilt the head back easily. Your baby shouldnt have to reach out to feed. Also make sure your babys nose is level with your nipple. This way, your baby will find it easier to attach to your breast.    Finally, get comfortable. Use pillows to support your  body. Dont lean over or slump to reach your baby.  Once your baby is attached to the breast, its okay to change your position slightly.    You will feel a bit of a tugging at first, but you should  never feel pain. If you do, ask your nurse or lactation expert for help. She will teach you how to latch your baby onto the breast in a way that feels more comfortable.    Breastfeeding in the hospital    For the first three days after birth, your body will  produce early milk called colostrum. This milk is  full of calories and antibodies to help keep your baby healthy. It is all your baby needs for the first few days.    Remember, babies do not eat anything while inside  you. Right after birth, your baby will only need a little bit of milk (about 1 teaspoon per feeding) to get the digestive system working well. Your baby will not need any formula--your body will make the right amount of milk.    Breastfeed whenever your baby shows signs of hunger. (See next page for a list of signs.) Crying is a late sign of hunger.    Babies often lose weight in the days after birth. This  is normal. By two weeks of age, your baby should be back to their birth weight. Your care team will watch your babys weight carefully.    If you are unable to breastfeed in the hospital, your  care team may suggest pasteurized donor human milk for your baby.             The Most Important Points to Remember    ? Your breast milk is the perfect food for your  baby. Breastfeeding has a lot of health benefits  for you  as well.    ? Hold your baby skin-to-skin as soon as possible  after birth. Do this for as long as you can. Even  if your baby doesnt go to the breast right away,  skin-to-skin contact helps your body make  more milk. This lets you get an early start on  Breastfeeding.    ? Learn how to position your baby at the breast.  This will help your baby feed well, and it will  keep you comfortable.    ? Feed your baby whenever your baby wants to  eat. You are feeding a baby, not a clock!    ? Signs that your baby is ready to eat include:  starting to wake up, chewing on fists, moving  the face from side to side, opening and closing  the mouth, sticking out the tongue and turning  toward the breast when held. Crying is a late  sign of hunger, so look for the earlier signals  that your baby makes.    ? Feed your baby only human milk for at least  six months. The World Health Organization  recommends breastfeeding for the first year,  noting it is a jaren baby who is  for  the first two years.    ? While in the hospital, plan to keep your baby  with you at all times, except for certain medical  procedures. This is an important time for you  and your baby to get to know each other and  practice breastfeeding.     Common questions    Below are questions that many women have about  breastfeeding. You will find further information in the  childbirth book your care team gave you. If you have more questions, speak with your midwife, nurse or doctor.    How do I involve my partner, family and  friends and get their help and support?    Sometimes family and friends dont understand why  you want to breastfeed. Perhaps they themselves  didnt breastfeed, or they werent  as infants. Tell them about the benefits of breastfeeding and how important it is to feed only human milk for the first six months or so. If you feed often, you will make plenty of milk to help your baby grow, fight illness and get the best start  possible.    After two to four weeks--once your baby is feeding  well and your milk supply is well established--your  partner and others can feed the baby your milk from  a cup, dropper or bottle. You can remove milk from  your breast (by hand or pump) and store it for later  use. This way, your baby will have your milk even  when youre away.    Remind everyone that there are lots of ways to help  that dont involve feeding: making meals, caring for  your other children, comforting the baby if they cries, changing diapers, running errands and more.    Is breastfeeding painful?    Not usually. There are ways to prevent pain and to  treat it if it happens.    The best ways to avoid pain are to feed your baby  often, use a good position and correctly latch your  baby onto the breast. These help prevent the two  most common sources of pain: sore nipples and  engorgement (overly full breasts). You will learn more about these topics in a breastfeeding class and in the hospital after your baby is born.         How much time does it take to breastfeed?    Some new mothers feel that all they do is breastfeed. In the early weeks, a baby eats 8 to 12 times per day. Sometimes babies will cluster feedings close together. At other times, there are longer stretches between feedings.    Remember, your newborns stomach will be about  the size of a walnut. Your baby wont eat much at each feeding. If you feed your baby often in the first days, your baby--and your milk supply--will grow. Your baby will eat more at each session, and you will need to nurse less often. Within a few weeks, most women find that breastfeeding is easier and takes less time than formula feeding.    How will I know if my baby is getting  enough milk?    Your body will start making milk as soon as your  baby is born. The more often you put your baby to  breast, the more milk you will make. You should avoid pacifiers for the first several weeks until breastfeeding  is well established--you want your baby to do all their sucking at the breast. This will help you make more milk.    There are signs that your baby is getting enough milk. You will learn these signs over time. For example:    ? You will count wet and soiled diapers, because  these show how much milk your baby is getting.    ? Your baby will seem satisfied after feedings.    ? Your baby will grow and gain weight after the first  few days.    Are there reasons why a woman shouldnt  breastfeed her baby?    There are a few medical concerns that prevent  breastfeeding. In mothers, these include being HIVpositive, having active or untreated TB (tuberculosis)  and using street drugs or some medicines. These  mothers usually choose infant formula for their  Babies.    A few women choose not to breastfeed for personal  reasons. But most mothers can breastfeed. If you are not sure if its okay to breastfeed, ask your care team.    Getting support    Before your baby is born, get as much information  as you can. Sign up for a breastfeeding class. Most  childbirth classes discuss breastfeeding, but a special class will give more in-depth information.    ? In [facility]: Go to [facility]  Center at [employer].    ? In [facility]: Go to www.[employer].  Click on Classes-and Events at the bottom of the  page, then search for breastfeeding. Or, call [phone].    Remember, help is available from your hospital, clinic, lactation experts or online. If you need help after leaving the hospital:    ? Call your babys clinic. (If you dont have a clinic,  call [phone] and ask for a referral.)    ? Call a lactation consultant. (If you need help  finding a location that offers lactation support, call  [phone].)    ? Call [employer] (24 hours a  day) at 2-672-ZZClearKarma [[phone]].    ? Call the Women, Infants and Children (WIC)  program at 1-559.705.7908.    ? Call the  National Womens Health Information  Center (English and Armenian) at 1-367.383.4332 or  go to www.womenshealth.gov/breastfeeding.    ? Go to [employer].     For informational purposes only. Not to replace the advice of your health care provider. Copyright   2010 [employer]. All rights reserved. Clinically reviewed by [employer] [facility]. [employer] 584829 - REV 06/18.

## 2021-06-18 NOTE — LETTER
Letter by Thelma Meyer DO at      Author: Thelma Meyer DO Service: -- Author Type: --    Filed:  Encounter Date: 2/5/2019 Status: (Other)       February 5, 2019     Patient: Boby Sesay   YOB: 2000   Date of Visit: 2/5/2019       To Whom It May Concern:    It is my medical opinion that Boby Sesay is pregnant. Her estimated due date is 4/08/2019.    If you have any questions or concerns, please don't hesitate to call.    Sincerely,        Electronically signed by Thelma Meyer DO

## 2021-06-18 NOTE — LETTER
Letter by Mago Denis PA-C at      Author: Mago Denis PA-C Service: -- Author Type: --    Filed:  Encounter Date: 1/22/2019 Status: (Other)       January 22, 2019     Patient: Boby Sesay   YOB: 2000   Date of Visit: 1/22/2019       To Whom it May Concern:    Boby Sesay was seen in my clinic on 1/22/2019.    If you have any questions or concerns, please don't hesitate to call.    Sincerely,         Electronically signed by Mago Denis PA-C

## 2021-06-18 NOTE — PATIENT INSTRUCTIONS - HE
Patient Instructions by Thelma Meyer DO at 1/29/2020  2:00 PM     Author: Thelma Meyer DO Service: -- Author Type: Physician    Filed: 1/29/2020  2:26 PM Encounter Date: 1/29/2020 Status: Signed    : Thelma Meyer DO (Physician)       Patient Education   HEALTHY PREGNANCY CARE: 26-30 WEEKS PREGNANT    You are now in your last trimester of pregnancy. Your baby is growing rapidly, can open and close her eyelids, sometimes get hiccups, and you'll probably feel her moving around more often. Your baby has breathing movements and is gaining about one ounce each day. You may notice heartburn and leg cramps. Your back may ache as your body gets used to your baby's size and length.    Discuss your work situation with your midwife or physician as needed. If you stand for long periods of time, you may need to make changes and take breaks.    Pre-register online at the hospital where your baby will be born ([email or web address])     Be aware of your baby's activity level. You may be asked to do daily fetal movement counts. Contact your midwife or physician about any decreased movement.    You may have been tested for gestational diabetes today. If you are RH negative, you may have had an additional test and a Rhogam injection.    Consider receiving a Tdap vaccination to protect your baby from Pertussis/whooping cough.    If you are considering tubal ligation, discuss this with your midwife or physician. You will need to have an appointment with the obstetrician who will do the surgery to discuss the risks, benefits, and alternatives, and to sign the consent. This can be discussed at your next visit.    Continue to watch for any signs or symptoms of premature labor:     Regular contractions. This means having about 6 or more within 1 hour, even after you have had a glass of water and are resting.     A backache that starts and stops regularly.    An increase or change  "in vaginal discharge, such as heavy, mucus-like, watery, or bloody discharge.     Your water breaks or leaks.    Continue to watch for signs and symptoms of preeclampsia:     Sudden swelling of your face, hands, or feet     New vision problems such as blurring, double vision, or flashing lights    A severe headache not relieved with acetaminophen (Tylenol)    Sharp or stabbing pain in your right or middle upper abdomen    If you have any of the above symptoms or any other concerns, call your midwife or physician or their clinic staff at [facility]ICINE/OB at Phone: [phone]. If it's after clinic hours, physician patients should call the [facility] at [phone] ([phone]); midwife patients should call their answering service at [phone].    How can you care for yourself at home?   You can refer to the Starting Out Right book or find it online at [email or web address] or [email or web address]/[email or web address]-starting-out-[email or web address]-starting-out-right.html#p=8     You can sign up for a weekly parenting e-mail that gives support, tips and advice from health care professionals that starts with pregnancy and continues through the toddler years. To register, go to [email or web address] at any time during your pregnancy.    BREASTFEEDING TIPS FOR NEW MOMS     Importance of skin-to-skin contact:  ? Gets breastfeeding off to a good start  ? Keeps baby warm and is great for bonding  ? Provides calming effects and helps to stabilize breathing and  blood sugar  ? Helps the uterus to contract and bleed less    Importance of feeding whenever baby shows signs of  being hungry, \"feeding on cue\":  ? Helps create a good milk supply appropriate to the babys needs  ? Less breastfeeding complications such as engorgement or  low supply  ? Helps baby get enough milk  ? Milk supply is determined by how often baby " nurses and empties  the breast.  ? Feeding is for comfort as well as nutrition    Importance of good latch (positioning and attaching  baby properly at breast):  ? Helps prevent sore nipples  ? Helps ensure baby gets enough milk and supports moms breast  milk supply    Risks of giving baby supplements other  than moms breastmilk  Breastfeeding alone is recommended for the first 6 months, if not it:  ? Can make baby more prone to illness  ? Can make baby less satisfied at breast (wanting larger amounts or  faster flow)  ? Reduces milk supply    Importance of rooming-in:  ? Increases parent confidence while mother is supported by the  hospital staff  ? Caregivers learn how to care for baby and recognize feeding cues  ? Enables feeding whenever baby needs to eat  ? Baby is comforted with mom and learns to recognize caregivers    Avoiding use of pacifiers:  ? Use of pacifiers in the first weeks can make it difficult to make a full  milk supply  ? May interfere with baby learning to latch well      2017 [facility] [place] [id number] 330812. DOD 11.17           Breastfeeding   Why Its Important and What to Expect     Your breast milk is the best food for your baby--and  breastfeeding can help you be healthy as well.    Though breastfeeding is natural, it is a learned process for both mother and baby. To prepare, there are things you can learn--and do--before your baby is born.    ? Learn the benefits of breastfeeding.    ? Understand the basic process.    ? Know what to expect in the hospital.    ? Arrange breastfeeding support for the first few  weeks after birth.    ? Take a breastfeeding class (see back page).    ? Talk to your midwife, nurse or doctor if you have  Questions.    The benefits of breastfeeding    Human milk changes to meet the needs of a growing baby. It is all a baby needs for the first six months of life.    In fact, babies who receive only human milk for the  first six months are less likely to develop colds,  the  flu, colic, asthma, ear infections, food allergies and  diarrhea (loose, watery stools). They may be less likely      to be overweight as children, and they are less likely to develop diabetes later in life. Some studies also show that infants have a higher IQ if they are .    Breastfeeding can:    ? Help you and your baby develop a special bond--  and make you feel proud that you can feed your  Baby.    ? Reduce the total amount of blood you will lose  after delivery.    ? Help your uterus return to its non-pregnant size.    ? Reduce the risk of Sudden Infant Death Syndrome (SIDS).    ? Help you lose your pregnancy weight more quickly.    ? Help delay the return of your monthly periods.    ? Lower your risk of some breast and ovarian  cancers--as well as osteoporosis (bone loss)--later in life.    ? Save you more than $300 per month. (This  includes the cost of formula and medical bills.  Formula-fed babies get sick more often.)          If you are deaf or hard of hearing, please let us know. We provide many free services including  sign language interpreters, oral interpreters, TTYs, telephone amplifiers, note takers and written materials.        How to breastfeed    Skin-to-skin contact    Hold your baby on your chest skin-to-skin right after  birth. Skin contact calms your baby, steadies their  breathing and keeps your baby warm. Your baby will be alert and will likely want to feed within the first hour after birth.    Babies are born with reflexes that help them  breastfeed. Your body will be ready with early milk  (called colostrum), so you will have all the milk your  baby needs for that first feeding. Your nurse will help you get started.    Keep your baby with you and breastfeed whenever  your baby is hungry. Offering the breast early and  often helps your body keep making lots of milk.    How to position your baby    There are many positions for breastfeeding.              No matter which  position you choose, support your  babys back, shoulders and neck. The head and body should be in a straight line, and the entire body should face the breast. Your baby should be able to tilt the head back easily. Your baby shouldnt have to reach out to feed. Also make sure your babys nose is level with your nipple. This way, your baby will find it easier to attach to your breast.    Finally, get comfortable. Use pillows to support your  body. Dont lean over or slump to reach your baby.  Once your baby is attached to the breast, its okay to change your position slightly.    You will feel a bit of a tugging at first, but you should  never feel pain. If you do, ask your nurse or lactation expert for help. She will teach you how to latch your baby onto the breast in a way that feels more comfortable.    Breastfeeding in the hospital    For the first three days after birth, your body will  produce early milk called colostrum. This milk is  full of calories and antibodies to help keep your baby healthy. It is all your baby needs for the first few days.    Remember, babies do not eat anything while inside  you. Right after birth, your baby will only need a little bit of milk (about 1 teaspoon per feeding) to get the digestive system working well. Your baby will not need any formula--your body will make the right amount of milk.    Breastfeed whenever your baby shows signs of hunger. (See next page for a list of signs.) Crying is a late sign of hunger.    Babies often lose weight in the days after birth. This  is normal. By two weeks of age, your baby should be back to their birth weight. Your care team will watch your babys weight carefully.    If you are unable to breastfeed in the hospital, your  care team may suggest pasteurized donor human milk for your baby.             The Most Important Points to Remember    ? Your breast milk is the perfect food for your  baby. Breastfeeding has a lot of health benefits  for you  as well.    ? Hold your baby skin-to-skin as soon as possible  after birth. Do this for as long as you can. Even  if your baby doesnt go to the breast right away,  skin-to-skin contact helps your body make  more milk. This lets you get an early start on  Breastfeeding.    ? Learn how to position your baby at the breast.  This will help your baby feed well, and it will  keep you comfortable.    ? Feed your baby whenever your baby wants to  eat. You are feeding a baby, not a clock!    ? Signs that your baby is ready to eat include:  starting to wake up, chewing on fists, moving  the face from side to side, opening and closing  the mouth, sticking out the tongue and turning  toward the breast when held. Crying is a late  sign of hunger, so look for the earlier signals  that your baby makes.    ? Feed your baby only human milk for at least  six months. The World Health Organization  recommends breastfeeding for the first year,  noting it is a jaren baby who is  for  the first two years.    ? While in the hospital, plan to keep your baby  with you at all times, except for certain medical  procedures. This is an important time for you  and your baby to get to know each other and  practice breastfeeding.     Common questions    Below are questions that many women have about  breastfeeding. You will find further information in the  childbirth book your care team gave you. If you have more questions, speak with your midwife, nurse or doctor.    How do I involve my partner, family and  friends and get their help and support?    Sometimes family and friends dont understand why  you want to breastfeed. Perhaps they themselves  didnt breastfeed, or they werent  as infants. Tell them about the benefits of breastfeeding and how important it is to feed only human milk for the first six months or so. If you feed often, you will make plenty of milk to help your baby grow, fight illness and get the best start  possible.    After two to four weeks--once your baby is feeding  well and your milk supply is well established--your  partner and others can feed the baby your milk from  a cup, dropper or bottle. You can remove milk from  your breast (by hand or pump) and store it for later  use. This way, your baby will have your milk even  when youre away.    Remind everyone that there are lots of ways to help  that dont involve feeding: making meals, caring for  your other children, comforting the baby if they cries, changing diapers, running errands and more.    Is breastfeeding painful?    Not usually. There are ways to prevent pain and to  treat it if it happens.    The best ways to avoid pain are to feed your baby  often, use a good position and correctly latch your  baby onto the breast. These help prevent the two  most common sources of pain: sore nipples and  engorgement (overly full breasts). You will learn more about these topics in a breastfeeding class and in the hospital after your baby is born.         How much time does it take to breastfeed?    Some new mothers feel that all they do is breastfeed. In the early weeks, a baby eats 8 to 12 times per day. Sometimes babies will cluster feedings close together. At other times, there are longer stretches between feedings.    Remember, your newborns stomach will be about  the size of a walnut. Your baby wont eat much at each feeding. If you feed your baby often in the first days, your baby--and your milk supply--will grow. Your baby will eat more at each session, and you will need to nurse less often. Within a few weeks, most women find that breastfeeding is easier and takes less time than formula feeding.    How will I know if my baby is getting  enough milk?    Your body will start making milk as soon as your  baby is born. The more often you put your baby to  breast, the more milk you will make. You should avoid pacifiers for the first several weeks until breastfeeding  is well established--you want your baby to do all their sucking at the breast. This will help you make more milk.    There are signs that your baby is getting enough milk. You will learn these signs over time. For example:    ? You will count wet and soiled diapers, because  these show how much milk your baby is getting.    ? Your baby will seem satisfied after feedings.    ? Your baby will grow and gain weight after the first  few days.    Are there reasons why a woman shouldnt  breastfeed her baby?    There are a few medical concerns that prevent  breastfeeding. In mothers, these include being HIVpositive, having active or untreated TB (tuberculosis)  and using street drugs or some medicines. These  mothers usually choose infant formula for their  Babies.    A few women choose not to breastfeed for personal  reasons. But most mothers can breastfeed. If you are not sure if its okay to breastfeed, ask your care team.    Getting support    Before your baby is born, get as much information  as you can. Sign up for a breastfeeding class. Most  childbirth classes discuss breastfeeding, but a special class will give more in-depth information.    ? In [facility]: Go to [facility] at [email or web address].    ? In [facility]: Go to [email or web address].  Click on Classes-and Events at the bottom of the  page, then search for breastfeeding. Or, call [phone].    Remember, help is available from your hospital, clinic, lactation experts or online. If you need help after leaving the hospital:    ? Call your babys clinic. (If you dont have a clinic,  call [phone] and ask for a referral.)    ? Call a lactation consultant. (If you need help  finding a location that offers lactation support, call  [phone].)    ? Call [employer] (24 hours a  day) at 6-144-YYWeather Analytics [1-176.788.9067].    ? Call the Women, Infants and Children (WIC)  program at 1-617.645.1273.    ? Call the  National Womens Health Information  Center (English and Turkish) at 1-159.952.7180 or  go to www.womenshealth.gov/breastfeeding.    ? Go to [employer]evocatal[employer].     For informational purposes only. Not to replace the advice of your health care provider. Copyright   2010 [employer]. All rights reserved. Clinically reviewed by [employer] [facility]. [employer] 531851 - REV 06/18.

## 2021-06-18 NOTE — LETTER
Letter by Mago Denis PA-C at      Author: Mago Denis PA-C Service: -- Author Type: --    Filed:  Encounter Date: 1/22/2019 Status: (Other)         01/22/19    To Whom It May Concern:    Boby Sesay was seen today at [facility] for Pregnancy Confirmation.    She is 25w5d.    Due Date: Estimated Date of Delivery: 5/2/19     Thank you.    Mago Denis PA-C

## 2021-06-19 NOTE — LETTER
Letter by Thelma Meyer DO at      Author: Thelma Meyer DO Service: -- Author Type: --    Filed:  Encounter Date: 5/14/2019 Status: (Other)         Boby Sesay  [address] [place] [address]             May 14, 2019         Dear Ms. Sesay,    Below are the results from your recent visit:    Resulted Orders   Hemoglobin   Result Value Ref Range    Hemoglobin 12.3 12.0 - 16.0 g/dL       Hemoglobin level is back to normal.    Please call with questions or contact us using [employer]    Sincerely,        Electronically signed by Thelma Meyer DO

## 2021-06-19 NOTE — LETTER
Letter by Thelma Meyer DO at      Author: Thelma Meyer DO Service: -- Author Type: --    Filed:  Encounter Date: 9/20/2019 Status: (Other)         September 20, 2019     Patient: Boby Sesay   YOB: 2000   Date of Visit: 9/20/2019       To Whom it May Concern:    Boby Sesay was seen in my clinic on 9/20/2019. She may return to school on 09/20/2019.    If you have any questions or concerns, please don't hesitate to call.    Sincerely,         Electronically signed by Thelma Meyer DO

## 2021-06-20 NOTE — LETTER
Letter by Mago Denis PA-C at      Author: Mago Denis PA-C Service: -- Author Type: --    Filed:  Encounter Date: 1/8/2020 Status: Signed         01/08/20    To Whom It May Concern:    Boby Sesay was seen today at [facility] for Pregnancy Confirmation.    She is 29w0d.    Due Date: Estimated Date of Delivery: 3/25/20     Thank you.    Mago Denis PA-C

## 2021-06-23 NOTE — PATIENT INSTRUCTIONS - HE
Patient Education   HEALTHY PREGNANCY CARE: 30-34 WEEKS PREGNANT    You have made it to the final months of your pregnancy. By now, your baby is starting to fill out with some fat under his skin, fuzzy hair on his shoulders, and is gaining 4 to 6 ounces per week.    Discuss any travel plans with your midwife or physician.    Review possible changes in sexuality during later pregnancy and discuss these with your midwife or physician, as well as your partner. Alternative love-making positions may be more comfortable.    Discuss labor and delivery issues with your midwife or physician. If you had a  birth in the past, discuss a trial of labor with your midwife or physician. He or she may ask that you sign a consent form, if you wish to have a vaginal birth after  (). Ask your midwife or physician to explain your options for managing pain during your labor and delivery. Sometimes, during the birth process, an episiotomy may need to be cut in the vagina to make the opening bigger or let the baby come out quicker. You may want to discuss the episiotomy and how often it is needed with your midwife or physician.    Plan for your baby's care by selecting a child health care provider (Family physician, Pediatrician, or Pediatric Nurse Practitioner). Practice installing an infant car seat correctly in the car. Ask for car seat information as needed and make sure it is safe and will work in the car your baby will ride in. You will need a car seat to bring your baby home from the hospital. Check the procedure for adding your baby to your health care plan. Review your decision about circumcision and ask for any information you need. As you buy and receive items for your baby, don't put a baby walker on your list. Walkers can be dangerous and can cause serious injury to your child. A safer option is a saucer-type play station, since it doesn't allow baby to travel across the floor.    Discuss your choices and  plans for birth control with your midwife or physician. Women who are breastfeeding can still become pregnant. Use a birth control method if you want to lower your pregnancy risk. Talk to your midwife or physician if you are considering permanent birth control, such as tubal ligation or Essure. You may need to complete a consent form 30 days prior to delivery in order to have this done after you deliver.    Continue to watch for signs and symptoms of preeclampsia:     Sudden swelling of your face, hands, or feet     New vision problems such as blurring, double vision, or flashing lights    A severe headache not relieved with acetaminophen (Tylenol)    Sharp or stabbing pain in your right or middle upper abdomen    Watch for signs and symptoms of premature labor:     Regular contractions. This means having about 6 or more within 1 hour, even after you have had a glass of water and are resting.     A backache that starts and stops regularly.    An increase or change in vaginal discharge, such as heavy, mucus-like, watery, or bloody discharge.     Your water breaks or leaks.    If you have any of the above symptoms or any other concerns, call your provider or their clinic staff at [facility] MEDICINE/OB at Phone: [phone]. If it's after clinic hours, physician patients should call the [facility] at [phone] ([phone]); midwife patients should call their answering service at [phone].    How can you care for yourself at home?   You can refer to the Starting Out Right book or find it online at [email or web address]/[email or web address]/[email or web address]-[email or web address]-Right.pdf or http://www.[email or web address]/images/stories/flipbooks/[facility]-starting-out-right/[facility]-starting-out-right.html#p=8     You can sign up for a weekly parenting e-mail that gives support, tips and advice from health care professionals that starts with pregnancy and continues through the toddler years. To register,  go to [email or web address] at any time during your pregnancy.

## 2021-06-23 NOTE — PROGRESS NOTES
Chief Complaint:  Chief Complaint   Patient presents with     Initial Prenatal Visit     Confirmation     HPI:   Boby Sesay is a 18 y.o. female is here for pregnancy intake.  She is .  Patient's last menstrual period was 2018 (within days).  Deonte in high school, living with her parents.  Boyfriend works in [place], will be back here soon?  Positive home pregnancy test at the end of July, per her report.    Past medical history: reviewed and updated.  No past medical history on file.  No past surgical history on file.    Current Outpatient Medications:      prenat.vits,jhon,min-iron-folic Tab, Take 1 tablet by mouth daily., Disp: 100 each, Rfl: 3    Social:  Social History     Tobacco Use     Smoking status: Never Smoker     Smokeless tobacco: Never Used   Substance Use Topics     Alcohol use: No     Frequency: Never     Drug use: No       OBJECTIVE:  No Known Allergies  Vitals:    19 1014   BP: 102/80   Pulse: 104   Resp: 20     There is no height or weight on file to calculate BMI.    Vital signs stable as recorded above  General: Patient is alert and oriented x 3, in no apparent distress  Fetal Exam: Fundal height was palpable at 25 cm, fetal heart tones are heard at 150 bpm.  Patient reports positive fetal movement.    Results:  Results for orders placed or performed in visit on 19   Culture, Urine   Result Value Ref Range    Culture No Growth    Pregnancy, Urine   Result Value Ref Range    Pregnancy Test, Urine Positive (!) Negative   ABO/RH Typing (OP order)   Result Value Ref Range    HML ABO/Rh Typing O POS     HML ABO/Rh Repeat Typing O POS    Hepatitis B Surface antigen (HBsAG)   Result Value Ref Range    Hepatitis B Surface Ag Negative Negative   HIV Antigen/Antibody Screening Cascade   Result Value Ref Range    HIV Antigen / Antibody Negative Negative   HML Antibody Screen   Result Value Ref Range    HML Antibody Screen Negative Negative   Lead, Blood   Result Value Ref Range    Lead   <5.0 ug/dL    Collection Method Venous     Lead Retest No    RPR   Result Value Ref Range    Treponema Antibody (Syphilis) Negative Negative   Rubella Immune Status (IgG)   Result Value Ref Range    Rubella Antibody, IgG Positive    Drugs of Abuse 1,Urine   Result Value Ref Range    Amphetamines Screen Negative Screen Negative    Benzodiazepines Screen Negative Screen Negative    Opiates Screen Negative Screen Negative    Phencyclidine Screen Negative Screen Negative    THC Screen Negative Screen Negative    Barbiturates Screen Negative Screen Negative    Cocaine Metabolite Screen Negative Screen Negative    Oxycodone Screen Negative Screen Negative    Creatinine, Urine 167.1 mg/dL   Urinalysis, OB Screen   Result Value Ref Range    Glucose, UA Negative Negative    Ketones, UA Negative Negative    Protein, UA Trace (!) Negative mg/dL   HM1 (CBC with Diff)   Result Value Ref Range    WBC 9.9 4.0 - 11.0 thou/uL    RBC 4.14 3.80 - 5.40 mill/uL    Hemoglobin 11.6 (L) 12.0 - 16.0 g/dL    Hematocrit 36.6 35.0 - 47.0 %    MCV 88 80 - 100 fL    MCH 28.0 27.0 - 34.0 pg    MCHC 31.7 (L) 32.0 - 36.0 g/dL    RDW 13.0 11.0 - 14.5 %    Platelets 230 140 - 440 thou/uL    MPV 10.9 8.5 - 12.5 fL    Neutrophils % 82 (H) 50 - 70 %    Lymphocytes % 11 (L) 20 - 40 %    Monocytes % 6 2 - 10 %    Eosinophils % 1 0 - 6 %    Basophils % 0 0 - 2 %    Neutrophils Absolute 8.0 (H) 2.0 - 7.7 thou/uL    Lymphocytes Absolute 1.1 0.8 - 4.4 thou/uL    Monocytes Absolute 0.6 0.0 - 0.9 thou/uL    Eosinophils Absolute 0.1 0.0 - 0.4 thou/uL    Basophils Absolute 0.0 0.0 - 0.2 thou/uL     Other screening pregnancy lab work is ordered and pending.    Assessment and Plan:  1. Pregnancy Intake Appointment.  Boby Sesay is 18 y.o. and .  Patient's last menstrual period was 2018 (within days).  Estimated Date of Delivery: 19  She will be seeing Dr. Meyer for OB care.  Screening pregnancy lab work was drawn.  Prenatal vitamins prescribed.  Problem  list and current medications reviewed and updated.  Dating and anatomic survey ultrasound ordered.  Prenatal info packet given.  She says she already got a flu shot this year.    2. Pregnancy, [employer]onte in High School.  Living with her parents.  Boyfriend working in [place], probably moving back here soon.  Discussed Public Health Nurse, referral made.    Follow up in 2 weeks.  Please see OB Episode for complete details.  Visit was approximately 40 minutes, greater than 50% of time spent in face-to-face counseling and coordination of care.    This dictation uses voice recognition software, which may contain typographical errors.

## 2021-06-23 NOTE — TELEPHONE ENCOUNTER
----- Message from Thelma Meyer DO sent at 2/5/2019 12:26 PM CST -----  Please call patient and inform:  Passed 1 hour Glucola test.  No signs of gestational diabetes.

## 2021-06-23 NOTE — TELEPHONE ENCOUNTER
----- Message from Wily Ramos MD sent at 2/7/2019  1:53 PM CST -----  Please contact her and let her know that her chlamydia was positive.  She and her partner need to be treated.  I called in a azithromycin.  If he is a patient here, we could call this into.

## 2021-06-23 NOTE — PROGRESS NOTES
First OB.  Late to prenatal care.  Just established with Mago end of January.  She was not seen prior to this.  She has history of irregular menses but believes her last menstrual cycle was in July.  She is a senior in high school.  Father of the baby is currently in [place] but plans to move back for the pregnancy.    Feeling well.   No pelvic pain  No other concerns  Feeling baby move.  No contractions, fluids or bleeding.    Taking PNV: No.  Will prescribe today.    No ctx, lof, bleeding, or discharge.  No HA or vision changes.      Labs/imaging reviewed: Labs within normal limits.  Dating ultrasound put her further along than we anticipated.  Measuring greater than 90th percentile.  Recommended follow-up.    Discussed screening for aneuploidy and neural tube defects, SMA and CF.  declines     Preformed physical exam   Reviewed BRENDA, past medical history, past surgical history, family history, genetic history, and previous obstetrical history.   Encouraged good nutrition, well balanced diet, regular activity.  Discussed foods to avoid.  And other applicable safety/health risks in pregnancy.     PHN: Referral already placed.  I called over today as patient states she had not heard anything.  They had attempted a call to her on January 25.  Patient does recall this phone number.  Gave her phone numbers to make contact with them.  Will follow up next visit to ensure that communication has happened.    Boby was seen today for initial prenatal visit.    Diagnoses and all orders for this visit:    Encounter for supervision of normal pregnancy in third trimester  -     Glucose,Gestational Challenge (1 Hour)  -     Tdap vaccine greater than or equal to 6yo IM  -     Urinalysis, OB Screen (ketones, glucose, protein only)  -     prenatal vit,calc76-iron-folic (PNV 29-1) 29 mg iron- 1 mg Tab; Take 1 tablet by mouth daily.  -     Chlamydia trachomatis & Neisseria gonorrhoeae, Amplified Detection    Late prenatal care  complicating pregnancy, third trimester      Follow-up in 2 weeks.  Will order growth ultrasound at next visit.    Thelma Meyer

## 2021-06-23 NOTE — TELEPHONE ENCOUNTER
Attempt #2 - Called and left voice mail to return call. Please relay message below upon call back.

## 2021-06-23 NOTE — TELEPHONE ENCOUNTER
Called and left message for patient to return call. Attempt #1. Please relay message below upon call back.

## 2021-06-23 NOTE — PATIENT INSTRUCTIONS - HE
Pregnancy: 25 weeks    Due Date: May 2, 2019    Next visit in 2 weeks  With Dr. Meyer  For Your First OB Visit

## 2021-06-24 NOTE — PROGRESS NOTES
No ctx, lof, bleeding, or discharge.  No HA or vision changes.  Good FM : yes  We have been trying to get a hold of her because she was positive for chlamydia on 2/6    Mu was seen today for routine prenatal visit.    Diagnoses and all orders for this visit:    Encounter for supervision of normal pregnancy in third trimester  -     Urinalysis, OB Screen (ketones, glucose, protein only)    Chlamydia infection affecting pregnancy in third trimester: Reviewed diagnosis of chlamydia and appropriate treatment as well as the importance of treatment during pregnancy.  Discussed her and her partner both need to take the medication.  Contacted by pharmacy that patient's partner will need his own prescription and we will contact her to get this information.  Discussed abstinence for at least 1 week after they have both been treated.  Stressed this greatly.  Will repeat urine culture in 1 month.  -     azithromycin (ZITHROMAX) 500 MG tablet; Take 2 tablets (1,000 mg total) by mouth once for 1 dose.      Thelma Meyer

## 2021-06-24 NOTE — TELEPHONE ENCOUNTER
Attempt #3 left message for the patient to return phone call. Upon call back, please obtain information.

## 2021-06-24 NOTE — TELEPHONE ENCOUNTER
I guess we will try to catch her at her next appointment. Can we call the pharmacy and make sure that the pt got her prescription

## 2021-06-24 NOTE — TELEPHONE ENCOUNTER
Medication Question or Clarification  Who is calling: Pharmacy: Caleb from [facility]  What medication are you calling about?: azithromycin (ZITHROMAX) 500 MG tablet  What dose do you take?: see below Rx  How often are you taking the medication?: see below Rx  Who prescribed the medication?: pcp  What is your question/concern?: Pharmacy reports they need a separate prescription for the partner so they can bill his insurance. Please advise asap!  Pharmacy: [id number] - [place], [place] - [address]  Okay to leave a detailed message?: Yes  Site CMT - Please call the pharmacy to obtain any additional needed information.  Medication Detail      Disp Refills Start End    azithromycin (ZITHROMAX) 500 MG tablet 4 tablet 0 2/19/2019 2/19/2019    Sig - Route: Take 2 tablets (1,000 mg total) by mouth once for 1 dose. - Oral    Sent to pharmacy as: azithromycin (ZITHROMAX) 500 MG tablet    Notes to Pharmacy: Expedited partner therapy.    E-Prescribing Status: Receipt confirmed by pharmacy (2/19/2019 11:02 AM CST)    Associated Diagnoses     Chlamydia infection affecting pregnancy in third trimester       Pharmacy     [id number] - [place], [place] - [address]

## 2021-06-24 NOTE — PROGRESS NOTES
No ctx, lof, bleeding, or discharge.  No HA or vision changes.  Good FM : yes  She states that when she went to the pharmacy to  her prescription there were only 2 tablets.  Discussed we are trying to get a hold of her as we needed her significant other's name and birthdate so we could call in a prescription for him.  She gave us this information today and we will do that.  Instructed her again to each take 2 tablets of the medication and to not have sex for at least 1 week.    Plan today:   Prescription called in for her partner for azithromycin to treat chlamydia.  Patient would like to breast-feed.  Reviewed this and skin to skin contact.  Reviewed baby medications  Filled out  homebound status form for her school.  Follow-up in 10 days for GBS swab and cervical check.  Patient had declined GBS swab today.    Handout given    Thelma Meeyr

## 2021-06-24 NOTE — PATIENT INSTRUCTIONS - HE
Patient Education   HEALTHY PREGNANCY CARE: 34-36 WEEKS PREGNANT    Your baby is gaining about an ounce each day, so healthy nutrition and rest are still very important. Learn about late pregnancy symptoms, such as constipation and backaches. Drinking more fluids and eating more fiber can relieve constipation. The pelvic tilt and a heating pad can ease backaches.    Continue to watch for signs and symptoms of preeclampsia:     Sudden swelling of your face, hands, or feet     New vision problems such as blurring, double vision, or flashing lights    A severe headache not relieved with acetaminophen (Tylenol)    Sharp or stabbing pain in your right or middle upper abdomen    You're almost done with your pregnancy but it's still too soon to have your baby. The goal is to have your baby after 37 weeks, so watch for signs and symptoms of premature labor:     Regular contractions. This means having about 6 or more within 1 hour, even after you have had a glass of water and are resting.     A backache that starts and stops regularly.    An increase or change in vaginal discharge, such as heavy, mucus-like, watery, or bloody discharge.     Your water breaks or leaks.    You will be tested for group B streptococcus (GBS), a type of bacteria found in 10-30% of pregnant women. A woman with GBS can pass it to her baby during delivery. Most babies who get GBS from their mothers do not have any problems, but some babies get very ill and need to be hospitalized for antibiotic treatment. Treating you with antibiotics during labor and delivery helps to prevent infection in your baby.    Review information on postpartum care that you will need after the baby is born.  Discuss your choices and plans for birth control with your midwife or physician.     Postpartum Care  During the days and weeks after the delivery of your baby (postpartum period), your body will change as it returns to its nonpregnant condition. As with pregnancy  "changes, postpartum changes are different for every woman.    Physical changes after childbirth  The changes in your body may include:    Contractions called afterpains shrink the uterus for several days after childbirth. Shrinking of the uterus to its prepregnancy size may take 6 to 8 weeks.    Sore muscles (especially in the arms, neck, or jaw) are common after childbirth. This is because of the hard work of labor and pushing your baby out. The soreness should go away in a few days.    Bleeding and vaginal discharge (lochia) may last for 2 to 8 weeks, and can come and go for about 2 months.    Vaginal soreness, including pain, discomfort, and numbness, is common after vaginal birth. Soreness may be worse if you had a perineal tear or episiotomy.    If you had a  birth (), you may have pain in your lower abdomen and may need pain medicine for 1 to 2 weeks.    Breast engorgement is common around the 3rd or 4th day after delivery, when the breasts begin to fill with milk. This can cause discomfort and swelling. If you are breast feeding, the best treatment is to feed your baby often or pump the milk out. You can also use warm compresses. If you are not breast feeding, placing ice packs on your breasts, taking a hot shower, or using warm compresses may relieve the discomfort.    Be aware of postpartum depression    \"Baby blues\" are common for the first 1 to 2 weeks after birth. You may cry or feel sad or irritable for no reason.     For some women, these feelings last longer and are more intense. This is called postpartum depression.     If your symptoms last for more than a few weeks or you feel very depressed, ask your midwife or physician for help.     Postpartum depression can be treated. Support groups and counseling can help, but sometimes medication is needed.     Discuss follow-up appointments with your midwife or physician, as well. He or she will usually want to see you 6 weeks after the " birth of your baby, sooner if you are having problems.    If you have questions about any symptoms you are experiencing or any other concerns, call your provider or their clinic staff at [facility]/OB at Phone: [phone]. If it's after clinic hours, physician patients should call the [facility] at [phone]); midwife patients should call their answering service at [phone].    How can you care for yourself at home?   You can refer to the Starting Out Right book or find it online at [email or web address]/[email or web address]://www.[email or web address]/stories/[email or web address]-[email or web address]-Right.pdf or http://www.[email or web address]/images/stories/flipbooks/[facility]-starting-out-right/[facility]-starting-out-right.html#p=8     You can sign up for a weekly parenting e-mail that gives support, tips and advice from health care professionals that starts with pregnancy and continues through the toddler years. To register, go to [email or web address] at any time during your pregnancy.        Making Plans for Feeding My Baby    By this point, you probably have read a lot about feeding your baby.  Breastfeed or formula? Each mother s decision is her own and [facility] respects you and your choices. We ve gathered information on both breastfeeding and formula feeding to help with your decision. Talking with your physician or nurse-midwife can also help in your decision.  However you plan to feed your baby, [facility] encourage rooming in with your baby, skin-to-skin contact and feeding your baby based on his or her cues.    Skin-to-skin contact  Being close to mom helps your baby adjust to life outside of the womb.  It helps your baby regulate their body temperature, heart rate, and breathing.  Your baby will usually be placed skin-to-skin immediately following birth or as soon as possible, if medical intervention is needed.    Rooming-In  Having your  baby stay with you in your room is called  rooming-in .  Keeping your baby in your room helps you to learn how to care for your baby by getting to know your baby s cues, body rhythms and sleep cycle.       Cue-based feeding  Cues (signals) are baby s way of telling you what he or she wants.  When you learn your infant s cues, you know how to care for and feed your baby.   Feeding cues are the licking and smacking of lips, bringing their fist to their mouth, and a reflex called  rooting - where baby turns and opens his or her mouth, searching for the breast or bottle.  Crying is a late feeding cue.  Babies can feed frequently, often at least 8 times in 24 hours.  Breastfeeding facts  Breast milk is the best source of nutrition for your baby and is available at birth. In the first couple of days, your milk volume is already starting to increase, though it may not be noticeable. Breastfeed frequently to increase your milk supply. Within three to five days, you will begin to notice larger milk volumes. An increase in breast size, heaviness and firmness are often described as the milk  coming in.  Frequent breastfeeding can help breasts from getting overly firm and painful. You will know the baby is getting enough milk if your baby is having wet and dirty diapers and gaining weight.     If your goal is to exclusively breastfeed, it is important to not use any formula or artificial nipples (including bottles and pacifiers) while your baby is learning to breastfeed.  While it may seem like an  easy  option to give your baby a bottle, formula should only be given if there is a medical reason for your baby to have it.    Positioning and attachment   Get comfortable.  Use pillows as needed to support your arms and baby.  Hold baby close at the level of your breast, facing you in a tummy to tummy position.  Skin to skin helps with this.  Position the baby with his or her nose by the nipple.  There should be a straight line  from baby s ear to shoulder to hips.  Tickle your baby s lips or wait for baby to open mouth wide, bring baby to breast by leading with the chin.  Aim the nipple at the roof of baby s mouth.  A rapid sucking pattern is followed by longer, drawing pattern with occasional swallows heard.  When baby is correctly latched, your nipple and much of the areola are pulled well into baby s mouth.      Returning to work or school  Focus on a good start to breastfeeding.  Many women continue to provide breastmilk for their baby when they return to work or school.  Making plans about where to pump and store milk can make the transition go well.  Talk with other mothers who have also returned to work or school for tips and support.  Your employer s Human Resource department may be a resource as well.     Returning to work or school: (continued)     babies can mean fewer  sick  days for you.    A quality breast pump will also save time and add comfort.  Check with your insurance prior to giving birth for breast pump coverage.  Many insurance companies include a pump within your benefits.    Wait until your baby is at least three weeks old to introduce a bottle for the first time.  Have someone besides you give the bottle.    Breastfeed when you are with your baby. Reserve your bottles of breast milk for when you are away.     Your breasts will need to be  emptied  either by your baby or a pump.  Plan to pump at least twice in an eight hour day.    If you cannot pump at work, continue breastfeeding at home. Any amount of breast milk is worth giving to your baby.    Formula feeding facts  If you are planning to use formula to feed your baby, you will want to make some preparations ahead of time. Talk to your doctor or nurse-midwife about what type of formula to use. Some are iron-fortified, meaning they have extra iron in them. You will want to purchase formula and bottles before your baby is born to be sure you are ready  after you return from the hospital. [facility] do not provide formula samples to take home.    Be sure to follow formula mixing directions closely. Regular milk in the dairy case at the grocery store should not be given to babies under 1 year old. Baby formula is sold in several forms including:    Ready-to-use. This is the most expensive, but no mixing is necessary.    Concentrated liquid. This is less expensive than ready-to-use and you mix with water.    Powder. This is the least expensive. You mix one level scoop of powdered formula with two ounces of water and stir well.    Most babies need 2.5 ounces of formula per pound of body weight each day. This means an 8-pound baby may drink about 20 ounces of formula a day; however, this is just an estimate. The most important thing is to pay attention to your baby s cues.  If your baby is always fussy, needs more iron or has certain food allergies, your physician may suggest you change your baby s formula to a different kind.   How do I warm my baby s bottles?  You may feed your baby a bottle without warming it first. It is OK for the breast milk or formula to be cool or room temperature. If your baby seems to prefer it warmed, you can put the filled bottle in a container of warm water and let it stand for a few minutes. Check the temperature of the liquid on your skin before feeding it to your baby; to be sure it isn t too hot. Do not heat bottles in the microwave. Microwaves heat food and liquids unevenly, and this can cause hot spots that can burn your baby.    How do I clean and sterilize bottles?  Sterilize bottles and nipples before you use them for the first time. You can do this by putting them in boiling water for 5 minutes. After that first time, you can wash them in hot and soapy water. Rinse them carefully to be sure there is no soap left on them. You can also wash them in the .    [facility] [phone] ([phone])

## 2021-06-24 NOTE — TELEPHONE ENCOUNTER
She has follow-up with me next week.  We will send letter today is unable to be get a hold of her.  Will discuss at next visit.

## 2021-06-24 NOTE — TELEPHONE ENCOUNTER
Called in prescription for patient's spouse Sachi Quiroz to the [facility] on [address]. Azithromycin 500 mg 2 tablets Take all at once. Per Dr. Meyer, patient is aware that we will call in rx. Task done

## 2021-06-24 NOTE — PATIENT INSTRUCTIONS - HE
Patient Education   HEALTHY PREGNANCY CARE: 34-36 WEEKS PREGNANT    Your baby is gaining about an ounce each day, so healthy nutrition and rest are still very important. Learn about late pregnancy symptoms, such as constipation and backaches. Drinking more fluids and eating more fiber can relieve constipation. The pelvic tilt and a heating pad can ease backaches.    Continue to watch for signs and symptoms of preeclampsia:     Sudden swelling of your face, hands, or feet     New vision problems such as blurring, double vision, or flashing lights    A severe headache not relieved with acetaminophen (Tylenol)    Sharp or stabbing pain in your right or middle upper abdomen    You're almost done with your pregnancy but it's still too soon to have your baby. The goal is to have your baby after 37 weeks, so watch for signs and symptoms of premature labor:     Regular contractions. This means having about 6 or more within 1 hour, even after you have had a glass of water and are resting.     A backache that starts and stops regularly.    An increase or change in vaginal discharge, such as heavy, mucus-like, watery, or bloody discharge.     Your water breaks or leaks.    You will be tested for group B streptococcus (GBS), a type of bacteria found in 10-30% of pregnant women. A woman with GBS can pass it to her baby during delivery. Most babies who get GBS from their mothers do not have any problems, but some babies get very ill and need to be hospitalized for antibiotic treatment. Treating you with antibiotics during labor and delivery helps to prevent infection in your baby.    Review information on postpartum care that you will need after the baby is born.  Discuss your choices and plans for birth control with your midwife or physician.     Postpartum Care  During the days and weeks after the delivery of your baby (postpartum period), your body will change as it returns to its nonpregnant condition. As with pregnancy  "changes, postpartum changes are different for every woman.    Physical changes after childbirth  The changes in your body may include:    Contractions called afterpains shrink the uterus for several days after childbirth. Shrinking of the uterus to its prepregnancy size may take 6 to 8 weeks.    Sore muscles (especially in the arms, neck, or jaw) are common after childbirth. This is because of the hard work of labor and pushing your baby out. The soreness should go away in a few days.    Bleeding and vaginal discharge (lochia) may last for 2 to 8 weeks, and can come and go for about 2 months.    Vaginal soreness, including pain, discomfort, and numbness, is common after vaginal birth. Soreness may be worse if you had a perineal tear or episiotomy.    If you had a  birth (), you may have pain in your lower abdomen and may need pain medicine for 1 to 2 weeks.    Breast engorgement is common around the 3rd or 4th day after delivery, when the breasts begin to fill with milk. This can cause discomfort and swelling. If you are breast feeding, the best treatment is to feed your baby often or pump the milk out. You can also use warm compresses. If you are not breast feeding, placing ice packs on your breasts, taking a hot shower, or using warm compresses may relieve the discomfort.    Be aware of postpartum depression    \"Baby blues\" are common for the first 1 to 2 weeks after birth. You may cry or feel sad or irritable for no reason.     For some women, these feelings last longer and are more intense. This is called postpartum depression.     If your symptoms last for more than a few weeks or you feel very depressed, ask your midwife or physician for help.     Postpartum depression can be treated. Support groups and counseling can help, but sometimes medication is needed.     Discuss follow-up appointments with your midwife or physician, as well. He or she will usually want to see you 6 weeks after the " birth of your baby, sooner if you are having problems.    If you have questions about any symptoms you are experiencing or any other concerns, call your provider or their clinic staff at [facility] at Phone: [phone]. If it's after clinic hours, physician patients should call the Care Connection at [phone] ([phone]); midwife patients should call their answering service at [phone].    How can you care for yourself at home?   You can refer to the Starting Out Right book or find it online at [email or web address]/images/stories/http://[email or web address]/images/stories/maternity/[facility]-[email or web address]-Right.pdf or http://www.[facility].org/images/stories/flipbooks/[facility]-starting-out-right/[facility]-starting-out-right.html#p=8     You can sign up for a weekly parenting e-mail that gives support, tips and advice from health care professionals that starts with pregnancy and continues through the toddler years. To register, go to [email or web address] at any time during your pregnancy.        Making Plans for Feeding My Baby    By this point, you probably have read a lot about feeding your baby.  Breastfeed or formula? Each mother s decision is her own and [facility] respects you and your choices. We ve gathered information on both breastfeeding and formula feeding to help with your decision. Talking with your physician or nurse-midwife can also help in your decision.  However you plan to feed your baby, [facility] encourage rooming in with your baby, skin-to-skin contact and feeding your baby based on his or her cues.    Skin-to-skin contact  Being close to mom helps your baby adjust to life outside of the womb.  It helps your baby regulate their body temperature, heart rate, and breathing.  Your baby will usually be placed skin-to-skin immediately following birth or as soon as possible, if medical intervention is needed.    Rooming-In  Having your  baby stay with you in your room is called  rooming-in .  Keeping your baby in your room helps you to learn how to care for your baby by getting to know your baby s cues, body rhythms and sleep cycle.       Cue-based feeding  Cues (signals) are baby s way of telling you what he or she wants.  When you learn your infant s cues, you know how to care for and feed your baby.   Feeding cues are the licking and smacking of lips, bringing their fist to their mouth, and a reflex called  rooting - where baby turns and opens his or her mouth, searching for the breast or bottle.  Crying is a late feeding cue.  Babies can feed frequently, often at least 8 times in 24 hours.  Breastfeeding facts  Breast milk is the best source of nutrition for your baby and is available at birth. In the first couple of days, your milk volume is already starting to increase, though it may not be noticeable. Breastfeed frequently to increase your milk supply. Within three to five days, you will begin to notice larger milk volumes. An increase in breast size, heaviness and firmness are often described as the milk  coming in.  Frequent breastfeeding can help breasts from getting overly firm and painful. You will know the baby is getting enough milk if your baby is having wet and dirty diapers and gaining weight.     If your goal is to exclusively breastfeed, it is important to not use any formula or artificial nipples (including bottles and pacifiers) while your baby is learning to breastfeed.  While it may seem like an  easy  option to give your baby a bottle, formula should only be given if there is a medical reason for your baby to have it.    Positioning and attachment   Get comfortable.  Use pillows as needed to support your arms and baby.  Hold baby close at the level of your breast, facing you in a tummy to tummy position.  Skin to skin helps with this.  Position the baby with his or her nose by the nipple.  There should be a straight line  from baby s ear to shoulder to hips.  Tickle your baby s lips or wait for baby to open mouth wide, bring baby to breast by leading with the chin.  Aim the nipple at the roof of baby s mouth.  A rapid sucking pattern is followed by longer, drawing pattern with occasional swallows heard.  When baby is correctly latched, your nipple and much of the areola are pulled well into baby s mouth.      Returning to work or school  Focus on a good start to breastfeeding.  Many women continue to provide breastmilk for their baby when they return to work or school.  Making plans about where to pump and store milk can make the transition go well.  Talk with other mothers who have also returned to work or school for tips and support.  Your employer s Human Resource department may be a resource as well.     Returning to work or school: (continued)     babies can mean fewer  sick  days for you.    A quality breast pump will also save time and add comfort.  Check with your insurance prior to giving birth for breast pump coverage.  Many insurance companies include a pump within your benefits.    Wait until your baby is at least three weeks old to introduce a bottle for the first time.  Have someone besides you give the bottle.    Breastfeed when you are with your baby. Reserve your bottles of breast milk for when you are away.     Your breasts will need to be  emptied  either by your baby or a pump.  Plan to pump at least twice in an eight hour day.    If you cannot pump at work, continue breastfeeding at home. Any amount of breast milk is worth giving to your baby.    Formula feeding facts  If you are planning to use formula to feed your baby, you will want to make some preparations ahead of time. Talk to your doctor or nurse-midwife about what type of formula to use. Some are iron-fortified, meaning they have extra iron in them. You will want to purchase formula and bottles before your baby is born to be sure you are ready  after you return from the hospital. [facility] do not provide formula samples to take home.    Be sure to follow formula mixing directions closely. Regular milk in the dairy case at the grocery store should not be given to babies under 1 year old. Baby formula is sold in several forms including:    Ready-to-use. This is the most expensive, but no mixing is necessary.    Concentrated liquid. This is less expensive than ready-to-use and you mix with water.    Powder. This is the least expensive. You mix one level scoop of powdered formula with two ounces of water and stir well.    Most babies need 2.5 ounces of formula per pound of body weight each day. This means an 8-pound baby may drink about 20 ounces of formula a day; however, this is just an estimate. The most important thing is to pay attention to your baby s cues.  If your baby is always fussy, needs more iron or has certain food allergies, your physician may suggest you change your baby s formula to a different kind.   How do I warm my baby s bottles?  You may feed your baby a bottle without warming it first. It is OK for the breast milk or formula to be cool or room temperature. If your baby seems to prefer it warmed, you can put the filled bottle in a container of warm water and let it stand for a few minutes. Check the temperature of the liquid on your skin before feeding it to your baby; to be sure it isn t too hot. Do not heat bottles in the microwave. Microwaves heat food and liquids unevenly, and this can cause hot spots that can burn your baby.    How do I clean and sterilize bottles?  Sterilize bottles and nipples before you use them for the first time. You can do this by putting them in boiling water for 5 minutes. After that first time, you can wash them in hot and soapy water. Rinse them carefully to be sure there is no soap left on them. You can also wash them in the .    [facility] Connection [phone] ([phone])

## 2021-06-24 NOTE — TELEPHONE ENCOUNTER
Attempt #1 left message for patient to call back. Need to know patient's partner's date of birth so we can call in the medication.

## 2021-06-24 NOTE — TELEPHONE ENCOUNTER
Called and spoke with the pharmacy. Pt has not picked up azithromycin and prenatal vitamins. After Ten days they put the medication back on the shelf.

## 2021-06-24 NOTE — TELEPHONE ENCOUNTER
Third attempt to reach patient without success. Per jose Joaquin to send letter. Letter created and mailed to patient's home address. Task done

## 2021-06-25 NOTE — ANESTHESIA PROCEDURE NOTES
Epidural Block    Patient location during procedure: OB    Reason for Block:labor epidural  Staffing:  Performing  Anesthesiologist: Jerald Elder MD  Preanesthetic Checklist  Completed: patient identified, risks, benefits, and alternatives discussed, timeout performed, consent obtained, at patient's request, airway assessed, oxygen available, suction available, emergency drugs available and hand hygiene performed  Procedure  Patient position: sitting  Prep: ChloraPrep and site prepped and draped  Patient monitoring: continuous pulse oximetry, heart rate and blood pressure  Approach: midline  Location: L3-L4  Injection technique: NATTY saline  Number of Attempts:1  Needle  Needle type: Anshu   Needle gauge: 18 G     Catheter in Space: 5  Assessment  Sensory level:  No complications

## 2021-06-25 NOTE — PATIENT INSTRUCTIONS - HE
[facility] L&D: [phone]    Patient Education   HEALTHY PREGNANCY CARE: 37 to 41 WEEKS PREGNANT    Talk with your midwife or physician about when to call with signs of labor    Regular uterine contractions that are getting closer together and/or stronger    If you think your water has broken or is leaking    Bleeding from the vagina like a period (bloody vaginal discharge is normal)    If you are not feeling your baby move    Make plans for transportation and  as needed for when you are going to the hospital.    Your midwife or physician may offer to check your cervix for changes.     Ask your health care provider about vaccinations you may need following delivery. By now, you should have received a Tdap immunization to protect against pertussis or whooping cough. Fathers and family members who will be in close contact with the baby should also receive a Tdap shot at least two weeks before the expected birth of the baby if they have not had a Td (tetanus) shot for at least two years.    If you are past your due date, discuss the next steps leading to delivery with your midwife or physician. If you don't start labor on your own by 41 or 42 weeks, your midwife or physician may recommend giving you medicines to ripen your cervix and start labor.    Preparing for your baby: Tell your midwife or physician how you plan to feed your baby (breast or bottle), who you have chosen to do pediatric care for your baby, and if you have a boy, whether you have chosen to have him circumcised. You will need a car seat correctly installed in your vehicle to bring your baby home. As you start to set up the nursery at home for your baby, make sure the crib is safe. The mattress needs to fit snugly against the edges of the crib. If you can fit a soda can between the bars, they are too far apart and can allow the baby's head to caught between them.    Learn about infant care and feeding, including information about infant  CPR. We recommend that you put your baby to sleep on his or her back to reduce the chance of Sudden Infant Death Syndrome (SIDS). To maintain a healthy environment in which your child can grow, it's best to keep your home smoke-free. By preparing ahead, your transition into parenthood will go smoothly for you and your baby.    Your midwife or physician will want to see you for a checkup 2 to 6 weeks after delivery.    If you have questions about any symptoms you are experiencing or any other concerns, call your provider or their clinic staff at [facility] at Phone: [phone]. If it's after clinic hours, physician patients should call the Care Connection at [phone] ([phone]); midwife patients should call their answering service at [phone].    How can you care for yourself at home?   You can refer to the Starting Out Right book or find it online at http://[facility].org/images/stories/maternity/[facility]-[email or web address]-Right.pdf or http://www.[facility].org/images/stories/flipbooks/[facility]starting[facility]out[facility]-starting-out-right.html#p=8     You can sign up for a weekly parenting e-mail that gives support, tips and advice from health care professionals that starts with pregnancy and continues through the toddler years. To register, go to [email or web address] at any time during your pregnancy.        Making Plans for Feeding My Baby    By this point, you probably have read a lot about feeding your baby.  Breastfeed or formula? Each mother s decision is her own and [facility] respects you and your choices. We ve gathered information on both breastfeeding and formula feeding to help with your decision. Talking with your physician or nurse-midwife can also help in your decision.  However you plan to feed your baby, [facility] encourage rooming in with your baby, skin-to-skin contact and feeding your baby based on his or her cues.    Skin-to-skin  contact  Being close to mom helps your baby adjust to life outside of the womb.  It helps your baby regulate their body temperature, heart rate, and breathing.  Your baby will usually be placed skin-to-skin immediately following birth or as soon as possible, if medical intervention is needed.    Rooming-In  Having your baby stay with you in your room is called  rooming-in .  Keeping your baby in your room helps you to learn how to care for your baby by getting to know your baby s cues, body rhythms and sleep cycle.       Cue-based feeding  Cues (signals) are baby s way of telling you what he or she wants.  When you learn your infant s cues, you know how to care for and feed your baby.   Feeding cues are the licking and smacking of lips, bringing their fist to their mouth, and a reflex called  rooting - where baby turns and opens his or her mouth, searching for the breast or bottle.  Crying is a late feeding cue.  Babies can feed frequently, often at least 8 times in 24 hours.  Breastfeeding facts  Breast milk is the best source of nutrition for your baby and is available at birth. In the first couple of days, your milk volume is already starting to increase, though it may not be noticeable. Breastfeed frequently to increase your milk supply. Within three to five days, you will begin to notice larger milk volumes. An increase in breast size, heaviness and firmness are often described as the milk  coming in.  Frequent breastfeeding can help breasts from getting overly firm and painful. You will know the baby is getting enough milk if your baby is having wet and dirty diapers and gaining weight.     If your goal is to exclusively breastfeed, it is important to not use any formula or artificial nipples (including bottles and pacifiers) while your baby is learning to breastfeed.  While it may seem like an  easy  option to give your baby a bottle, formula should only be given if there is a medical reason for your baby to  have it.    Positioning and attachment   Get comfortable.  Use pillows as needed to support your arms and baby.  Hold baby close at the level of your breast, facing you in a tummy to tummy position.  Skin to skin helps with this.  Position the baby with his or her nose by the nipple.  There should be a straight line from baby s ear to shoulder to hips.  Tickle your baby s lips or wait for baby to open mouth wide, bring baby to breast by leading with the chin.  Aim the nipple at the roof of baby s mouth.  A rapid sucking pattern is followed by longer, drawing pattern with occasional swallows heard.  When baby is correctly latched, your nipple and much of the areola are pulled well into baby s mouth.      Returning to work or school  Focus on a good start to breastfeeding.  Many women continue to provide breastmilk for their baby when they return to work or school.  Making plans about where to pump and store milk can make the transition go well.  Talk with other mothers who have also returned to work or school for tips and support.  Your employer s Human Resource department may be a resource as well.     Returning to work or school: (continued)     babies can mean fewer  sick  days for you.    A quality breast pump will also save time and add comfort.  Check with your insurance prior to giving birth for breast pump coverage.  Many insurance companies include a pump within your benefits.    Wait until your baby is at least three weeks old to introduce a bottle for the first time.  Have someone besides you give the bottle.    Breastfeed when you are with your baby. Reserve your bottles of breast milk for when you are away.     Your breasts will need to be  emptied  either by your baby or a pump.  Plan to pump at least twice in an eight hour day.    If you cannot pump at work, continue breastfeeding at home. Any amount of breast milk is worth giving to your baby.    Formula feeding facts  If you are planning to  use formula to feed your baby, you will want to make some preparations ahead of time. Talk to your doctor or nurse-midwife about what type of formula to use. Some are iron-fortified, meaning they have extra iron in them. You will want to purchase formula and bottles before your baby is born to be sure you are ready after you return from the hospital. [facility] do not provide formula samples to take home.    Be sure to follow formula mixing directions closely. Regular milk in the dairy case at the grocery store should not be given to babies under 1 year old. Baby formula is sold in several forms including:    Ready-to-use. This is the most expensive, but no mixing is necessary.    Concentrated liquid. This is less expensive than ready-to-use and you mix with water.    Powder. This is the least expensive. You mix one level scoop of powdered formula with two ounces of water and stir well.    Most babies need 2.5 ounces of formula per pound of body weight each day. This means an 8-pound baby may drink about 20 ounces of formula a day; however, this is just an estimate. The most important thing is to pay attention to your baby s cues.  If your baby is always fussy, needs more iron or has certain food allergies, your physician may suggest you change your baby s formula to a different kind.   How do I warm my baby s bottles?  You may feed your baby a bottle without warming it first. It is OK for the breast milk or formula to be cool or room temperature. If your baby seems to prefer it warmed, you can put the filled bottle in a container of warm water and let it stand for a few minutes. Check the temperature of the liquid on your skin before feeding it to your baby; to be sure it isn t too hot. Do not heat bottles in the microwave. Microwaves heat food and liquids unevenly, and this can cause hot spots that can burn your baby.    How do I clean and sterilize bottles?  Sterilize bottles and nipples before you  use them for the first time. You can do this by putting them in boiling water for 5 minutes. After that first time, you can wash them in hot and soapy water. Rinse them carefully to be sure there is no soap left on them. You can also wash them in the .    [facility] [phone] ([phone]

## 2021-06-25 NOTE — ANESTHESIA POSTPROCEDURE EVALUATION
Patient: Mu C Sesay  * No procedures listed *  Anesthesia type: epidural    Patient location: [facility]  Last vitals:   Vitals:    03/20/19 0240   BP: 99/67   Pulse: 85   Resp: 17   Temp: 36.9  C (98.5  F)   SpO2:      Post vital signs: stable  Level of consciousness: awake and responds to simple questions  Post-anesthesia pain: pain controlled  Post-anesthesia nausea and vomiting: no  Pulmonary: unassisted, return to baseline  Cardiovascular: stable and blood pressure at baseline  Hydration: adequate  Anesthetic events: no    QCDR Measures:  ASA# 11 - Laury-op Cardiac Arrest: ASA11B - Patient did NOT experience unanticipated cardiac arrest  ASA# 12 - Laury-op Mortality Rate: ASA12B - Patient did NOT die  ASA# 13 - PACU Re-Intubation Rate: ASA13B - Patient did NOT require a new airway mgmt  ASA# 10 - Composite Anes Safety: ASA10A - No serious adverse event    Additional Notes:

## 2021-06-25 NOTE — PROGRESS NOTES
No ctx, lof, bleeding, or discharge.  No HA or vision changes.  Good FM :yes  She still has not taken azithromycin for the chlamydia.  She states her boyfriend is picking up his medication today and they are both going to take it.  I strongly reemphasized the importance of taking this medication especially prior to when baby gets delivered.  And abstain from sex for 1 week after taking the medication.  She states understanding again.    Boby was seen today for routine prenatal visit.    Diagnoses and all orders for this visit:    Encounter for supervision of normal pregnancy in third trimester  -     Group B Strep Screen by PCR  -     Urinalysis, OB Screen (ketones, glucose, protein only)      Reviewed signs and symptoms of labor and when to go to the hospital.  Phone number given for [facility].  Reviewed skin to skin benefits  GUERITAE as above  Reviewed pain management options and she would like an epidural.  Discuss postpartum birth control at next visit  Follow-up in 1 week.  Handout given    Thelma Meyer

## 2021-06-25 NOTE — ANESTHESIA PREPROCEDURE EVALUATION
Anesthesia Evaluation      Patient summary reviewed   No history of anesthetic complications     Airway   Mallampati: II  Neck ROM: full   Pulmonary - negative ROS                          Cardiovascular - negative ROS  Exercise tolerance: > or = 4 METS   Neuro/Psych - negative ROS     Endo/Other    (+) pregnant     GI/Hepatic/Renal - negative ROS           Dental                         Anesthesia Plan  Planned anesthetic: epidural    ASA 2     Anesthetic plan and risks discussed with: patient    Post-op plan: epidural analgesia

## 2021-07-03 NOTE — ADDENDUM NOTE
Addendum Note by Thelma Meyer DO at 5/14/2019  3:00 PM     Author: Thelma Meyer DO Service: -- Author Type: Physician    Filed: 5/14/2019  3:27 PM Encounter Date: 5/14/2019 Status: Signed    : Thelma Meyer DO (Physician)    Addended by: THELMA MEYER on: 5/14/2019 03:27 PM        Modules accepted: Level of Service

## 2021-07-14 PROBLEM — O98.813 CHLAMYDIA INFECTION AFFECTING PREGNANCY IN THIRD TRIMESTER: Status: RESOLVED | Noted: 2019-02-19 | Resolved: 2020-01-08

## 2021-07-14 PROBLEM — Z34.90 PREGNANT: Status: RESOLVED | Noted: 2019-03-19 | Resolved: 2020-01-08

## 2021-07-14 PROBLEM — A74.9 CHLAMYDIA INFECTION AFFECTING PREGNANCY IN THIRD TRIMESTER: Status: RESOLVED | Noted: 2019-02-19 | Resolved: 2020-01-08

## 2021-07-14 PROBLEM — O09.33 LATE PRENATAL CARE COMPLICATING PREGNANCY, THIRD TRIMESTER: Status: RESOLVED | Noted: 2019-02-05 | Resolved: 2020-01-08

## 2021-11-27 ENCOUNTER — HEALTH MAINTENANCE LETTER (OUTPATIENT)
Age: 21
End: 2021-11-27

## 2022-04-06 NOTE — TELEPHONE ENCOUNTER
"Called and left message for pt to return call.# 1  \" Okay to relay message\"    " Acitretin Counseling:  I discussed with the patient the risks of acitretin including but not limited to hair loss, dry lips/skin/eyes, liver damage, hyperlipidemia, depression/suicidal ideation, photosensitivity.  Serious rare side effects can include but are not limited to pancreatitis, pseudotumor cerebri, bony changes, clot formation/stroke/heart attack.  Patient understands that alcohol is contraindicated since it can result in liver toxicity and significantly prolong the elimination of the drug by many years.

## 2022-09-03 ENCOUNTER — HEALTH MAINTENANCE LETTER (OUTPATIENT)
Age: 22
End: 2022-09-03

## 2023-01-14 ENCOUNTER — HEALTH MAINTENANCE LETTER (OUTPATIENT)
Age: 23
End: 2023-01-14

## 2024-02-17 ENCOUNTER — HEALTH MAINTENANCE LETTER (OUTPATIENT)
Age: 24
End: 2024-02-17